# Patient Record
Sex: MALE | Race: WHITE | NOT HISPANIC OR LATINO | Employment: UNEMPLOYED | URBAN - METROPOLITAN AREA
[De-identification: names, ages, dates, MRNs, and addresses within clinical notes are randomized per-mention and may not be internally consistent; named-entity substitution may affect disease eponyms.]

---

## 2017-05-09 ENCOUNTER — GENERIC CONVERSION - ENCOUNTER (OUTPATIENT)
Dept: OTHER | Facility: OTHER | Age: 72
End: 2017-05-09

## 2017-06-13 ENCOUNTER — LAB CONVERSION - ENCOUNTER (OUTPATIENT)
Dept: OTHER | Facility: OTHER | Age: 72
End: 2017-06-13

## 2017-06-13 ENCOUNTER — GENERIC CONVERSION - ENCOUNTER (OUTPATIENT)
Dept: OTHER | Facility: OTHER | Age: 72
End: 2017-06-13

## 2017-06-13 LAB
EST. AVERAGE GLUCOSE BLD GHB EST-MCNC: 13.6 (CALC)
EST. AVERAGE GLUCOSE BLD GHB EST-MCNC: 246 (CALC)
HBA1C MFR BLD HPLC: 10.2 % OF TOTAL HGB
PROSTATE SPECIFIC ANTIGEN TOTAL (HISTORICAL): 0.8 NG/ML

## 2017-06-21 ENCOUNTER — GENERIC CONVERSION - ENCOUNTER (OUTPATIENT)
Dept: OTHER | Facility: OTHER | Age: 72
End: 2017-06-21

## 2017-08-21 ENCOUNTER — GENERIC CONVERSION - ENCOUNTER (OUTPATIENT)
Dept: OTHER | Facility: OTHER | Age: 72
End: 2017-08-21

## 2017-08-22 ENCOUNTER — ALLSCRIPTS OFFICE VISIT (OUTPATIENT)
Dept: OTHER | Facility: OTHER | Age: 72
End: 2017-08-22

## 2017-08-23 ENCOUNTER — GENERIC CONVERSION - ENCOUNTER (OUTPATIENT)
Dept: OTHER | Facility: OTHER | Age: 72
End: 2017-08-23

## 2017-10-25 ENCOUNTER — GENERIC CONVERSION - ENCOUNTER (OUTPATIENT)
Dept: OTHER | Facility: OTHER | Age: 72
End: 2017-10-25

## 2017-10-31 ENCOUNTER — GENERIC CONVERSION - ENCOUNTER (OUTPATIENT)
Dept: OTHER | Facility: OTHER | Age: 72
End: 2017-10-31

## 2018-01-10 NOTE — RESULT NOTES
Message   please enter colorectal screen on flow sheet     Verified Results  (Q) FECAL GLOBIN BY IMMUNOCHEM  (MEDICARE) [id number] 12:00AM Wendy Bellamy     Test Name Result Flag Reference   FECAL GLOBIN BY$IMMUNOCHEM  (MEDICARE)      FECAL GLOBIN BY IMMUNOCHEM   (MEDICARE)         MICRO NUMBER:      [id number]    TEST STATUS:       FINAL    SPECIMEN SOURCE:   INSURE () FOBT TEST CARD    SPECIMEN QUALITY:  ADEQUATE    RESULT:            Not Detected

## 2018-01-11 NOTE — PROGRESS NOTES
Assessment    1  Medicare annual wellness visit, subsequent (V70 0) (Z00 00)   2  Benign essential hypertension (401 1) (I10)   3  Olecranon bursitis of right elbow (726 33) (M70 21)   4  Chronic obstructive pulmonary disease (496) (J44 9)   5  Anemia (285 9) (D64 9)    Plan  Anemia    · (1) CBC/PLT/DIFF; Status:Active; Requested [id number];    · (1) FERRITIN; Status:Active; Requested OBE:53THJ4213;    · (1) TIBC; Status:Active; Requested LMV:43BNY0852;    · (Q) VITAMIN B12/FOLATE, SERUM PANEL; Status:Active; Requested BHQ:03ITS1823;   Diabetes mellitus type II, controlled    · (1) HEMOGLOBIN A1C; Status:Active; Requested CYZ:19ETL4600;   Difficulty breathing    · (1) TSH; Status:Active; Requested for:48Bzn8714;   Hyperlipidemia    · (Q) LIPID PANEL WITH DIRECT LDL; Status:Active; Requested IJY:86QCS6763; Olecranon bursitis of right elbow    · Azithromycin 500 MG Oral Tablet; 1 tab daily for 5 days    Discussion/Summary    1) blood work, cbc, hba1c and lipid in   2) anemia: stool tests - given   3) azithromycin 500mg 1 tab daily for 5 days  4) cool compresses, 15 min 3 times a day, avoid leaning, use compression sleeve  5) continue follow up with cardiology and pulmonology    Impression: Subsequent Annual Wellness Visit  Cardiovascular screening and counseling: due for a lipid panel  Diabetes screening and counseling: screening is current  Colorectal cancer screening and counseling: the patient declines screening and screening not indicated  Prostate cancer screening and counseling: screening not indicated  Osteoporosis screening and counseling: due for DXA axial skeleton  Abdominal aortic aneurysm screening and counseling: has aneurysm  Glaucoma screening and counseling: ophthalmologist referral    HIV screening and counseling: screening not indicated   Immunizations: influenza vaccine is up to date this year, influenza vaccination is recommended annually, the lifetime pneumococcal vaccine has been completed, hepatitis B vaccination series is not indicated at this time due to the patient's low risk of florentin the disease, the patient declines the Zostavax vaccine, the risks and benefits of the Td vaccine were discussed with the patient and the risks and benefits of the Tdap vaccine were discussed with the patient  Advance Directive Planning: complete and up to date  Chief Complaint  PT IS HERE FOR ANNUAL AWV  History of Present Illness  pt here with his daughter for awv  he complains of right elbow injury, scraped on countertop edge now with swelling and warmth, symptoms about 1 week  pt was on predisone october -january 20mg then 15 through February and now 10mg 1 tab daily   pt is oxygen dependent  he is not smoking cigarettes but he is using e cigarettes  He is anemic  He is seeing the pulmonologist in [place] where he lives and cardiology in [facility]  pt had pneumococcal in [facility] and had influenza immunization at his place of residence this year  The patient is being seen for the subsequent annual wellness visit  Medicare Screening and Risk Factors   Hospitalizations: no previous hospitalizations  Once per lifetime medicare screening tests: ECG has not been done and AAA screening US has not yet been done  Medicare Screening Tests Risk Questions   Abdominal aortic aneurysm risk assessment: tobacco use, over 72years of age and family history of AAA  Osteoporosis risk assessment: , over 48years of age and tobacco use  HIV risk assessment: none indicated  Drug and Alcohol Use: The patient is a former cigarette smoker, is a current smokeless tobacco user and PT CHEWS TOBACCO, AND USES E-CIGS  He has smoked for 60 YRS year(s), has 2-3 PACKS PER DAY pack year(s) of cigarette use and QUIT 1 YR AGO  He is not ready to quit using tobacco and is quitting tobacco use  The patient reports occasional alcohol use and drinking 4-5 DRINKS PER MONTH drinks per month  Alcohol concern:   The patient has no concerns about alcohol abuse  He has never used illicit drugs  Diet and Physical Activity: Current diet includes unhealthy food choices, 1-2 servings of fruit per day, 1-2 servings of vegetables per day, 1-2 servings of meat per day, 0 servings of whole grains per day, 4-5 servings of simple carbohydrates per day, 1 servings of dairy products per day, 2-3 cups of coffee per day, 0 cups of tea per day, 0 cans of regular soda per day, 1-2 cans of diet soda per day and 1-2 GLASSES OF WATER PER DAY  He exercises infrequently  Exercise: STATIONARY BIKE 20-40 WEEKLY minutes per week  Mood Disorder and Cognitive Impairment Screening: PHQ-9 Depression Scale   Over the past 2 weeks, how often have you been bothered by the following problems? 1 ) Little interest or pleasure in doing things? Several days  2 ) Feeling down, depressed or hopeless? Several days  3 ) Trouble falling asleep or sleeping too much? Not at all    4 ) Feeling tired or having little energy? Half the days or more  5 ) Poor appetite or overeating? Several days  6 ) Feeling bad about yourself, or that you are a failure, or have let yourself or your family down? Not at all    7 ) Trouble concentrating on things, such as reading a newspaper or watching television? Several days  8 ) Moving or speaking so slowly that other people could have noticed, or the opposite, moving or speaking faster than usual? Not at all  TOTAL SCORE: 6, severity of depression is mild  How difficult have these problems made it for you to do your work, take care of things at home, or get along with people? Extremely difficult  Depression screening  mild to moderate symptoms  He denies feeling down, depressed, or hopeless over the past two weeks  He reports feeling little interest or pleasure in doing things over the past two weeks     Cognitive impairment screening: denies difficulty learning/retaining new information, denies difficulty handling complex tasks, denies difficulty with reasoning, denies difficulty with spatial ability and orientation, denies difficulty with language and denies difficulty with behavior  Functional Ability/Level of Safety: Hearing is normal bilaterally, normal in the right ear, normal in the left ear and a hearing aid is not used  He denies hearing difficulties  The patient is currently able to do activities of daily living with limitations, able to do instrumental activities of daily living with limitations, able to participate in social activities with limitations and able to drive with limitations  Activities of daily living details: transportation help needed, needs help shopping, meal preparation help needed, needs help doing housework, needs help doing laundry, needs help managing medications and PT HAS DIFFICULITY DRIVING AT NIGHT, but does not need help using the phone and does not need help managing money  Home safety risk factors:  no handrails on the stairs, but no unfamiliar surroundings, no loose rugs, no poor household lighting, no uneven floors, no household clutter and grab bars in the bathroom  Advance Directives: Advance directives: living will, durable power of  for health care directives, advance directives and medical decisions will be made by Clemmie Pallas, daughter, and wife  end of life decisions were reviewed with the patient and I agree with the patient's decisions  Co-Managers and Medical Equipment/Suppliers: See Patient Care Team      Patient Care Team    Care Team Member Role Specialty Office Number   Balwinder Garcias MD Specialist Cardiology [phone]   Osman SANTOS   Specialist Pulmonary Medicine [phone]   Adryan Granger MD  Urology [phone] [address] Specialist Thoracic Surgery [phone]   Mary Corona [address] [facility] [phone]   Devi Rasmussen 79, [address] [facility] [place] [facility] Systems    Constitutional: negative  Head and Face: negative  Eyes: negative  ENT: negative  Cardiovascular: negative  Respiratory: shortness of breath, but as noted in HPI  Gastrointestinal: negative  Genitourinary: negative  Musculoskeletal: right elbow inury, but as noted in HPI  Integumentary and Breasts: negative  Neurological: negative  Psychiatric: negative  Endocrine: negative  Hematologic and Lymphatic: negative  Active Problems    1  Abdominal aortic aneurysm without rupture (441 4) (I71 4)   2  Allergic rhinitis (477 9) (J30 9)   3  Anemia (285 9) (D64 9)   4  Aneurysm of ascending aorta (441 2) (I71 2)   5  Anxiety disorder (300 00) (F41 9)   6  Benign essential hypertension (401 1) (I10)   7  Benign prostatic hypertrophy (600 00) (N40 0)   8  Bronchitis, chronic obstructive, with exacerbation (491 21) (J44 1)   9  CAD (coronary artery disease) (414 00) (I25 10)   10  Chronic obstructive pulmonary disease (496) (J44 9)   11  Depression (311) (F32 9)   12  Diabetes mellitus type II, controlled (250 00) (E11 9)   13  Difficulty breathing (786 09) (R06 89)   14  Ecchymoses, spontaneous (782 7) (R23 3)   15  Encounter for screening colonoscopy (V76 51) (Z12 11)   16  Erectile dysfunction of non-organic origin (302 72) (F52 21)   17  Hypercholesteremia (272 0) (E78 0)   18  Hyperlipidemia (272 4) (E78 5)   19  Hyperthyroidism (242 90) (E05 90)   20  Hypoxemia (799 02) (R09 02)   21  Impaired fasting glucose (790 21) (R73 01)   22  Insomnia (780 52) (G47 00)   23  Medicare annual wellness visit, subsequent (V70 0) (Z00 00)   24  Need for prophylactic vaccination and inoculation against influenza (V04 81) (Z23)   25  Osteoporosis screening (V82 81) (Z13 820)   26  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)   27  Screening for colon cancer (V76 51) (Z12 11)   28  Sebaceous cyst (706 2) (L72 3)   29  Vascular Dementia (290 40)    Past Medical History    1  History of Acute Myocardial Infarction (V12 59)   2  History of Anaphylaxis (V13 81)   3  History of Angioedema (995 1)   4  History of Exposed To Contagious Viral Disease (Influenza) (V01 79)   5  History of shortness of breath (V13 89) (Z87 898)   6  History of transient cerebral ischemia (V12 54) (Z86 73)   7  Need for prophylactic vaccination and inoculation against influenza (V04 81) (Z23)   8  History of Status post angioplasty (V45 89) (Z98 62)    The active problems and past medical history were reviewed and updated today  Surgical History    1  History of Abdominal Aortic Aneurysm Repair For Dilation Or Occlusion   2  History of Palatopharyngoplasty    The surgical history was reviewed and updated today  Social History    · Current Every Day Smoker (305 1)   · Death In The Family Spouse  The social history was reviewed and updated today  The social history was reviewed and is unchanged  Current Meds   1  Advair Diskus 500-50 MCG/DOSE Inhalation Aerosol Powder Breath Activated; USE 1   INHALATION ORALLY    TWICE DAILY; Therapy: 60UOP3919 to (Evaluate:13Woc7405)  Requested for: 97JND3828; Last   Rx:32Krx3699 Ordered   2  Azithromycin 250 MG Oral Tablet; One tablet daily; Last Rx:40Iye1118 Ordered   3  Azithromycin 250 MG Oral Tablet; Take 1 tablet daily; Therapy: 31XMR3125 to (Evaluate:07Jan2017)  Requested for: 12Apr2016; Last   Rx:12Apr2016 Ordered   4  Budesonide 0 25 MG/2ML Inhalation Suspension; INHALE THE CONTENTS OF 1   UNIT   DOSE VIA NEBULIZER    TWO  TIMES A DAY; Therapy: 89EZQ5872 to (Evaluate:95Ask3778)  Requested for: 54Mnp3572; Last   Rx:16Elq9699 Ordered   5  BusPIRone HCl - 15 MG Oral Tablet; TAKE 1 TABLET 3 times a day; Therapy: 08EYQ0343 to (Evaluate:58Yba7600)  Requested for: 12Apr2016; Last   Rx:12Apr2016 Ordered   6  Cialis 5 MG Oral Tablet; Therapy: 02Apr2013 to Recorded   7   Combivent Respimat  MCG/ACT Inhalation Aerosol Solution; USE 1 INHALATION ORALLY 4  TIMES DAILY (MAXIMUM OF 6  INHALATIONS IN   24 HOURS); Therapy: 40HYD1287 to (Evaluate:66Kjv8852)  Requested for: 48RDP9652; Last   Rx:04Mar2016 Ordered   8  Crestor 10 MG Oral Tablet; TAKE 1 TABLET DAILY AT     BEDTIME; Therapy: 59BUR0029 to (Evaluate:97Oiz4084)  Requested for: 73MLD6532; Last   Rx:34Xrq4567 Ordered   9  Diltiazem HCl ER Coated Beads 240 MG Oral Capsule Extended Release 24 Hour; take   1 capsule bid; Therapy: (Recorded:19Mar2014) to Recorded   10  EpiPen 2-Rajan 0 3 MG/0 3ML JEFF; INJECT SUBCUTANEOUSLY AS DIRECTED; Therapy: 14QMS0835 to (Last [place])  Requested for: 83JDV6146 Ordered   11  Fish Oil CAPS; 1200 mg, one capsule daily; Therapy: (Recorded:79Riu6363) to Recorded   12  Fluticasone Propionate 50 MCG/ACT Nasal Suspension; USE 2 SPRAYS IN EACH          NOSTRIL DAILY; Therapy: 36CBF4814 to (Evaluate:29Iwo0185)  Requested for: 72EED3613; Last    Rx:81Bgt6126 Ordered   13  Folic Acid 050 MCG Oral Tablet; TAKE 1 TABLET DAILY AS DIRECTED; Therapy: (Recorded:83Cvo5337) to Recorded   14  Furosemide 20 MG Oral Tablet; take 1 tablet by mouth every day; Therapy: 79FAU6671 to (Evaluate:06Jun2016)  Requested for: 75KAI0869; Last    Rx:08Mar2016 Ordered   15  GuaiFENesin 400 MG Oral Tablet; TAKE 1 TABLET EVERY 8 HOURS AS NEEDED; Therapy: 93IWD9887 to Recorded   16  Iron 142 (45 Fe) MG Oral Tablet Extended Release; Therapy: 45Lnu3030 to Recorded   17  Januvia 100 MG Oral Tablet; TAKE 1 TABLET DAILY AS     DIRECTED; Therapy: 71KHM9470 to (Evaluate:60Fqv3628)  Requested for: 92Dju4119; Last    Rx:12Apr2016 Ordered   18  Klor-Con 10 10 MEQ Oral Tablet Extended Release; Take one tablet twice a day; Therapy: 87NYF2137 to (Evaluate:62Sip2862)  Requested for: 72OFV7519; Last    Rx:21Mej6448 Ordered   19  Levalbuterol HCl - 1 25 MG/3ML Inhalation Nebulization Solution; INHALE THE    CONTENTS OF 1   VIAL VIA NEBULIZER EVERY   4-6    HOURS AS NEEDED;     Therapy: 51IJP6431 to (Evaluate:20Mar2016)  Requested for: 58RTZ9581; Last    Rx:07Klh9491 Ordered   20  Levofloxacin 500 MG Oral Tablet; One tablet a day for 10 days; Therapy: 79ARK3863 to (Evaluate:79Zcd7856)  Requested for: 79LHU0215; Last    Rx:51Cdt5437 Ordered   21  LORazepam 0 5 MG Oral Tablet; TAKE 1 TAB 4 TIMES A DAY AS NEEDED; Therapy: 62WZY3814 to (Evaluate:67Amr6677)  Requested for: 33Glv7556; Last    Rx:12Apr2016 Ordered   22  MetFORMIN HCl ER (OSM) 1000 MG Oral Tablet Extended Release 24 Hour; take 1 tablet    twice a day; Therapy: 61Mjh0714 to (Evaluate:09Xau7030)  Requested for: 34Kub2828; Last    Rx:70Zna9535 Ordered   23  Mirtazapine 15 MG Oral Tablet; TAKE 1 TABLET AT BEDTIME; Therapy: 23XMJ6554 to (Evaluate:44Sfc8758)  Requested for: 12Apr2016; Last    Rx:12Apr2016 Ordered   24  Plavix 75 MG Oral Tablet; TAKE 1 TABLET DAILY; Therapy: 19Clp0792 to ()  Requested for: 80Iep0656 Recorded   25  Potassium Chloride Tonja ER 10 MEQ Oral Tablet Extended Release; Take one tablet    twice a day; Therapy: 40KGK6810 to (Evaluate:19Nov2016)  Requested for: 89IYA7555; Last    Rx:25Nov2015 Ordered   26  PredniSONE 10 MG Oral Tablet; TAKE 1 TABLET DAILY AS DIRECTED; Therapy: 79RAT3478 to (Evaluate:64Dyy6841)  Requested for: 20XRA7036; Last    Rx:02Nov2015 Ordered   27  PredniSONE 5 MG Oral Tablet; take as directed; Therapy: 83CMU4145 to (Carol Cabrera)  Requested for: 46AUV8444; Last    Rx:02Nov2015 Ordered   28  Prevnar 13 Intramuscular Suspension; 0 5 ml IM at pharmacy; Therapy: 38Dns3370 to (Last Rx:29Apr2015) Ordered   29  ProAir  (90 Base) MCG/ACT Inhalation Aerosol Solution; INHALE 2 PUFFS    EVERY 4 HOURS AS NEEDED; Therapy: 49FAB4412 to (Evaluate:16Jun2016)  Requested for: 53MQC8738; Last    Rx:22Jun2015 Ordered   30  TraZODone HCl - 50 MG Oral Tablet; take 2 tablets at bedtime;     Therapy: 31SEJ5859 to (Evaluate:04Huf8643)  Requested for: 12Apr2016; Last    Rx:12Apr2016 Ordered   31  Valsartan-Hydrochlorothiazide 160-12 5 MG Oral Tablet; TAKE 1 TABLET DAILY; Therapy: (Recorded:25Cne5581) to Recorded   32  ZyrTEC Allergy TABS; TAKE 1 TABLET DAILY; Therapy: (Recorded:10Dms9285) to Recorded    The medication list was reviewed and updated today  Allergies    1  Rapaflo CAPS   2  Flomax CP24   3  Vasotec TABS   4  Wellbutrin TABS    Immunizations  Influenza --- Pegge Dopp: 49WBG8389Pxdau Evaristo: 98JAP2889   Pneumococcal --- Pegge Dopp: 64YDU8396;  Series2: 18AGH1180     Vitals  Signs [Data Includes: Current Encounter]   Recorded: 59WLW6500 01:59PM   Temperature: 98 4 F  Heart Rate: 77  Systolic: 437  Diastolic: 74  Height: 5 ft 7 in  Weight: 161 lb 12 8 oz  BMI Calculated: 25 34  BSA Calculated: 1 85  O2 Saturation: 78    Signatures   Electronically signed by : Lamberto Tucker DO; May  3 2016 10:26PM EST                       (Author)

## 2018-01-13 NOTE — RESULT NOTES
Verified Results  (Q) FECAL GLOBIN BY IMMUNOCHEM  (MEDICARE) [id number] 12:00AM Sukhjinder Canas     Test Name Result Flag Reference   FECAL GLOBIN BY$IMMUNOCHEM  (MEDICARE)      FECAL GLOBIN BY IMMUNOCHEM  (MEDICARE)         MICRO NUMBER:      [id number]    TEST STATUS:       FINAL    SPECIMEN SOURCE:   NOT GIVEN    SPECIMEN QUALITY:  ADEQUATE    RESULT:            Test cancelled per client request      (Q) MICROALBUMIN, RANDOM URINE (W/CREATININE) 44ZIH8259 12:00AM Sukhjinder Canas     Test Name Result Flag Reference   CREATININE, RANDOM URINE 191 mg/dL     MICROALBUMIN 2 2 mg/dL     Reference Range  Not established   MICROALBUMIN/CREATININE$RATIO, RANDOM URINE 12 mcg/mg creat  <30   The ADA defines abnormalities in albumin  excretion as follows:     Category         Result (mcg/mg creatinine)     Normal                    <30  Microalbuminuria            Clinical albuminuria   > OR = 300     The ADA recommends that at least two of three  specimens collected within a 3-6 month period be  abnormal before considering a patient to be  within a diagnostic category

## 2018-01-14 VITALS
TEMPERATURE: 98.3 F | WEIGHT: 157.5 LBS | BODY MASS INDEX: 24.72 KG/M2 | DIASTOLIC BLOOD PRESSURE: 60 MMHG | HEART RATE: 78 BPM | SYSTOLIC BLOOD PRESSURE: 108 MMHG | OXYGEN SATURATION: 74 % | HEIGHT: 67 IN

## 2018-01-14 NOTE — RESULT NOTES
Verified Results  (Q) LIPID PANEL WITH DIRECT LDL 20EVF4804 10:57AM Christina Mejia     Test Name Result Flag Reference   CHOLESTEROL, TOTAL 170 mg/dL  125-200   HDL CHOLESTEROL 90 mg/dL  > OR = 40   TRIGLICERIDES 97 mg/dL  <886   DIRECT LDL 63 mg/dL  <130   Desirable range <100 mg/dL for patients with CHD or  diabetes and <70 mg/dL for diabetic patients with  known heart disease  CHOL/HDLC RATIO 1 9 (calc)  < OR = 5 0   NON HDL CHOLESTEROL 80 mg/dL (calc)     Target for non-HDL cholesterol is 30 mg/dL higher than   LDL cholesterol target

## 2018-01-15 NOTE — RESULT NOTES
Verified Results  (Q) HEMOGLOBIN A1c 59SRU6582 12:40PM Carole New   REPORT COMMENT:  FASTING:NO     Test Name Result Flag Reference   HEMOGLOBIN A1c 9 3 % of total Hgb H <5 7   According to ADA guidelines, hemoglobin A1c <7 0%  represents optimal control in non-pregnant diabetic  patients  Different metrics may apply to specific  patient populations  Standards of Medical Care in    Diabetes Care  2013;36:s11-s66     For the purpose of screening for the presence of  diabetes  <5 7%       Consistent with the absence of diabetes  5 7-6 4%    Consistent with increased risk for diabetes              (prediabetes)  >or=6 5%    Consistent with diabetes     This assay result is consistent with diabetes  mellitus  Currently, no consensus exists for use of hemoglobin  A1c for diagnosis of diabetes for children

## 2018-01-16 NOTE — RESULT NOTES
Verified Results  (Q) HEMOGLOBIN A1c WITH eAG 12Jun2017 11:44AM Roshan Simms   REPORT COMMENT:  FASTING:YES     Test Name Result Flag Reference   HEMOGLOBIN A1c 10 2 % of total Hgb H <5 7   For someone without known diabetes, a hemoglobin A1c  value of 6 5% or greater indicates that they may have   diabetes and this should be confirmed with a follow-up   test      For someone with known diabetes, a value <7% indicates   that their diabetes is well controlled and a value   greater than or equal to 7% indicates suboptimal   control  A1c targets should be individualized based on   duration of diabetes, age, comorbid conditions, and   other considerations  Currently, no consensus exists regarding use of  hemoglobin A1c for diagnosis of diabetes for children  eAG (mg/dL) 246 (calc)     eAG (mmol/L) 13 6 (calc)       (1) PSA (SCREEN) (Dx V76 44 Screen for Prostate Cancer) 37BZL7820 11:44AM Roshan Simms     Test Name Result Flag Reference   PSA, TOTAL 0 8 ng/mL  < OR = 4 0   The total PSA value from this assay system is   standardized against the WHO standard  The test   result will be approximately 20% lower when compared   to the equimolar-standardized total PSA (Elizabeth   Adel)  Comparison of serial PSA results should be   interpreted with this fact in mind  This test was performed using the Siemens   chemiluminescent method  Values obtained from   different assay methods cannot be used  interchangeably  PSA levels, regardless of  value, should not be interpreted as absolute  evidence of the presence or absence of disease

## 2018-02-09 DIAGNOSIS — E11.59 TYPE 2 DIABETES MELLITUS WITH OTHER CIRCULATORY COMPLICATION, WITHOUT LONG-TERM CURRENT USE OF INSULIN (HCC): Primary | ICD-10-CM

## 2018-02-09 DIAGNOSIS — F51.04 CHRONIC INSOMNIA: ICD-10-CM

## 2018-02-09 DIAGNOSIS — J44.9 CHRONIC OBSTRUCTIVE PULMONARY DISEASE, UNSPECIFIED COPD TYPE (HCC): ICD-10-CM

## 2018-02-09 DIAGNOSIS — E78.49 OTHER HYPERLIPIDEMIA: ICD-10-CM

## 2018-02-10 RX ORDER — TRAZODONE HYDROCHLORIDE 50 MG/1
TABLET ORAL
Qty: 180 TABLET | Refills: 0 | Status: SHIPPED | OUTPATIENT
Start: 2018-02-10 | End: 2018-04-30 | Stop reason: SDUPTHER

## 2018-02-10 RX ORDER — ROSUVASTATIN CALCIUM 10 MG/1
TABLET, COATED ORAL
Qty: 90 TABLET | Refills: 0 | Status: SHIPPED | OUTPATIENT
Start: 2018-02-10 | End: 2018-04-30 | Stop reason: SDUPTHER

## 2018-02-10 RX ORDER — IPRATROPIUM/ALBUTEROL SULFATE 20-100 MCG
MIST INHALER (GRAM) INHALATION
Qty: 9 INHALER | Refills: 2 | Status: SHIPPED | OUTPATIENT
Start: 2018-02-10 | End: 2019-06-07 | Stop reason: SDUPTHER

## 2018-02-10 RX ORDER — SITAGLIPTIN 100 MG/1
TABLET, FILM COATED ORAL
Qty: 90 TABLET | Refills: 0 | Status: SHIPPED | OUTPATIENT
Start: 2018-02-10 | End: 2018-04-30 | Stop reason: SDUPTHER

## 2018-03-29 ENCOUNTER — TELEPHONE (OUTPATIENT)
Dept: FAMILY MEDICINE CLINIC | Facility: CLINIC | Age: 73
End: 2018-03-29

## 2018-03-30 DIAGNOSIS — F51.01 PRIMARY INSOMNIA: ICD-10-CM

## 2018-03-30 DIAGNOSIS — R35.0 BENIGN PROSTATIC HYPERPLASIA WITH URINARY FREQUENCY: Primary | ICD-10-CM

## 2018-03-30 DIAGNOSIS — F52.21 ERECTILE DYSFUNCTION OF NON-ORGANIC ORIGIN: ICD-10-CM

## 2018-03-30 DIAGNOSIS — N40.1 BENIGN PROSTATIC HYPERPLASIA WITH URINARY FREQUENCY: Primary | ICD-10-CM

## 2018-03-30 DIAGNOSIS — R53.82 CHRONIC FATIGUE: ICD-10-CM

## 2018-03-30 DIAGNOSIS — F33.1 MODERATE EPISODE OF RECURRENT MAJOR DEPRESSIVE DISORDER (HCC): ICD-10-CM

## 2018-04-30 DIAGNOSIS — E78.49 OTHER HYPERLIPIDEMIA: ICD-10-CM

## 2018-04-30 DIAGNOSIS — E11.59 TYPE 2 DIABETES MELLITUS WITH OTHER CIRCULATORY COMPLICATION, WITHOUT LONG-TERM CURRENT USE OF INSULIN (HCC): ICD-10-CM

## 2018-04-30 DIAGNOSIS — F51.04 CHRONIC INSOMNIA: ICD-10-CM

## 2018-05-01 RX ORDER — SITAGLIPTIN 100 MG/1
TABLET, FILM COATED ORAL
Qty: 90 TABLET | Refills: 0 | Status: SHIPPED | OUTPATIENT
Start: 2018-05-01 | End: 2018-07-17 | Stop reason: SDUPTHER

## 2018-05-01 RX ORDER — TRAZODONE HYDROCHLORIDE 50 MG/1
TABLET ORAL
Qty: 180 TABLET | Refills: 0 | Status: SHIPPED | OUTPATIENT
Start: 2018-05-01 | End: 2018-07-17 | Stop reason: SDUPTHER

## 2018-05-01 RX ORDER — ROSUVASTATIN CALCIUM 10 MG/1
TABLET, COATED ORAL
Qty: 90 TABLET | Refills: 0 | Status: SHIPPED | OUTPATIENT
Start: 2018-05-01 | End: 2018-07-17 | Stop reason: SDUPTHER

## 2018-05-01 RX ORDER — MIRTAZAPINE 15 MG/1
TABLET, FILM COATED ORAL
Qty: 90 TABLET | Refills: 0 | Status: SHIPPED | OUTPATIENT
Start: 2018-05-01 | End: 2018-07-17 | Stop reason: SDUPTHER

## 2018-05-04 ENCOUNTER — TELEPHONE (OUTPATIENT)
Dept: FAMILY MEDICINE CLINIC | Facility: CLINIC | Age: 73
End: 2018-05-04

## 2018-05-04 LAB
DIFF CAP.CO: 5.4 ML/MMHG SEC
FEV1/FVC: 41 %
FEV1: 0.39 LITERS
FVC VOL RESPIRATORY: 0.96 LITERS
TLC: 4.33 LITERS

## 2018-05-04 ASSESSMENT — PULMONARY FUNCTION TESTS
FVC: 0.96
FEV1/FVC: 41
FEV1: 0.39

## 2018-05-16 ENCOUNTER — TELEPHONE (OUTPATIENT)
Dept: FAMILY MEDICINE CLINIC | Facility: CLINIC | Age: 73
End: 2018-05-16

## 2018-05-16 NOTE — TELEPHONE ENCOUNTER
Pt's daughter Is requesting a med refill for lorazepam 0 5 mg for pt,  FYI-pt' daughter will stop by tomorrow to  a hard copy of a blood test order for testosterone

## 2018-05-17 DIAGNOSIS — F33.1 MODERATE EPISODE OF RECURRENT MAJOR DEPRESSIVE DISORDER (HCC): ICD-10-CM

## 2018-05-17 DIAGNOSIS — R53.82 CHRONIC FATIGUE: ICD-10-CM

## 2018-05-17 DIAGNOSIS — F52.21 ERECTILE DYSFUNCTION OF NON-ORGANIC ORIGIN: ICD-10-CM

## 2018-05-17 DIAGNOSIS — F41.9 ANXIETY: Primary | ICD-10-CM

## 2018-05-17 RX ORDER — LORAZEPAM 0.5 MG/1
1 TABLET ORAL 4 TIMES DAILY PRN
COMMUNITY
Start: 2012-03-30 | End: 2018-05-17 | Stop reason: SDUPTHER

## 2018-05-17 RX ORDER — LORAZEPAM 0.5 MG/1
0.5 TABLET ORAL 4 TIMES DAILY PRN
Qty: 120 TABLET | Refills: 0 | Status: SHIPPED | OUTPATIENT
Start: 2018-05-17 | End: 2018-08-24 | Stop reason: SDUPTHER

## 2018-05-17 NOTE — TELEPHONE ENCOUNTER
Will send lorazepam to pharmacy  Which lab is he going for the testosterone? So I can put in orders?    Please call daughter

## 2018-05-17 NOTE — TELEPHONE ENCOUNTER
Informed pt daughter   she stated she will be going to [facility] or [facility]  Pt daughter did  a hard copy of the order

## 2018-05-18 ENCOUNTER — OFFICE VISIT (OUTPATIENT)
Dept: FAMILY MEDICINE CLINIC | Facility: CLINIC | Age: 73
End: 2018-05-18
Payer: MEDICARE

## 2018-05-18 VITALS
BODY MASS INDEX: 24.43 KG/M2 | SYSTOLIC BLOOD PRESSURE: 112 MMHG | TEMPERATURE: 97.6 F | HEART RATE: 74 BPM | DIASTOLIC BLOOD PRESSURE: 50 MMHG | OXYGEN SATURATION: 84 % | WEIGHT: 156 LBS

## 2018-05-18 DIAGNOSIS — IMO0002 UNCONTROLLED TYPE 2 DIABETES MELLITUS WITH OTHER SPECIFIED COMPLICATION, WITHOUT LONG-TERM CURRENT USE OF INSULIN: Primary | ICD-10-CM

## 2018-05-18 DIAGNOSIS — J43.9 PULMONARY EMPHYSEMA, UNSPECIFIED EMPHYSEMA TYPE (HCC): ICD-10-CM

## 2018-05-18 DIAGNOSIS — I10 BENIGN ESSENTIAL HYPERTENSION: ICD-10-CM

## 2018-05-18 DIAGNOSIS — I71.2 THORACIC AORTIC ANEURYSM WITHOUT RUPTURE (HCC): ICD-10-CM

## 2018-05-18 DIAGNOSIS — E78.2 MIXED HYPERLIPIDEMIA: ICD-10-CM

## 2018-05-18 DIAGNOSIS — I25.10 CORONARY ARTERY DISEASE INVOLVING NATIVE HEART WITHOUT ANGINA PECTORIS, UNSPECIFIED VESSEL OR LESION TYPE: ICD-10-CM

## 2018-05-18 DIAGNOSIS — J96.11 CHRONIC HYPOXEMIC RESPIRATORY FAILURE (HCC): ICD-10-CM

## 2018-05-18 DIAGNOSIS — I73.9 PERIPHERAL VASCULAR DISEASE (HCC): ICD-10-CM

## 2018-05-18 PROBLEM — R32 URINARY INCONTINENCE: Status: ACTIVE | Noted: 2017-08-22

## 2018-05-18 LAB — SL AMB POCT HEMOGLOBIN AIC: 10

## 2018-05-18 PROCEDURE — 83036 HEMOGLOBIN GLYCOSYLATED A1C: CPT | Performed by: FAMILY MEDICINE

## 2018-05-18 PROCEDURE — 99214 OFFICE O/P EST MOD 30 MIN: CPT | Performed by: FAMILY MEDICINE

## 2018-05-18 RX ORDER — ONDANSETRON 4 MG/1
1 TABLET, ORALLY DISINTEGRATING ORAL EVERY 8 HOURS
COMMUNITY
Start: 2017-12-09 | End: 2018-11-23 | Stop reason: SDUPTHER

## 2018-05-18 RX ORDER — LORAZEPAM 0.5 MG/1
0.5 TABLET ORAL
COMMUNITY
Start: 2013-03-04 | End: 2018-07-01 | Stop reason: SDUPTHER

## 2018-05-18 RX ORDER — POTASSIUM CHLORIDE 750 MG/1
1 TABLET, FILM COATED, EXTENDED RELEASE ORAL 2 TIMES DAILY
COMMUNITY
Start: 2014-12-16

## 2018-05-18 RX ORDER — CYCLOBENZAPRINE HCL 10 MG
TABLET ORAL
COMMUNITY
Start: 2017-11-01

## 2018-05-18 RX ORDER — AZITHROMYCIN 250 MG/1
250 TABLET, FILM COATED ORAL
COMMUNITY
End: 2018-08-14 | Stop reason: SDUPTHER

## 2018-05-18 RX ORDER — TADALAFIL 5 MG/1
TABLET ORAL
COMMUNITY
Start: 2013-04-02

## 2018-05-18 RX ORDER — ALBUTEROL SULFATE 90 UG/1
AEROSOL, METERED RESPIRATORY (INHALATION)
COMMUNITY
Start: 2014-03-19 | End: 2018-10-02 | Stop reason: SDUPTHER

## 2018-05-18 RX ORDER — FINASTERIDE 5 MG/1
TABLET, FILM COATED ORAL
COMMUNITY

## 2018-05-18 RX ORDER — ROSUVASTATIN CALCIUM 10 MG/1
10 TABLET, COATED ORAL
COMMUNITY
Start: 2012-06-19 | End: 2018-07-01 | Stop reason: SDUPTHER

## 2018-05-18 RX ORDER — PREDNISONE 10 MG/1
10 TABLET ORAL DAILY
Refills: 1 | COMMUNITY
Start: 2018-04-26 | End: 2018-06-20 | Stop reason: SDUPTHER

## 2018-05-18 RX ORDER — LEVALBUTEROL 1.25 MG/.5ML
SOLUTION, CONCENTRATE RESPIRATORY (INHALATION)
COMMUNITY
Start: 2013-10-15 | End: 2018-05-18

## 2018-05-18 RX ORDER — LEVALBUTEROL INHALATION SOLUTION 1.25 MG/3ML
1 SOLUTION RESPIRATORY (INHALATION)
COMMUNITY
Start: 2012-10-11

## 2018-05-18 RX ORDER — BUDESONIDE 0.25 MG/2ML
INHALANT ORAL
COMMUNITY
Start: 2018-04-30 | End: 2018-08-24 | Stop reason: SDUPTHER

## 2018-05-18 RX ORDER — VALSARTAN 80 MG/1
40 TABLET ORAL
COMMUNITY
Start: 2018-05-18 | End: 2018-11-23 | Stop reason: ALTCHOICE

## 2018-05-18 RX ORDER — MIRTAZAPINE 15 MG/1
15 TABLET, FILM COATED ORAL
COMMUNITY
Start: 2014-03-21 | End: 2018-05-18

## 2018-05-18 RX ORDER — EPINEPHRINE 0.3 MG/.3ML
INJECTION SUBCUTANEOUS
COMMUNITY
Start: 2012-10-24

## 2018-05-18 RX ORDER — PREDNISONE 10 MG/1
15 TABLET ORAL
COMMUNITY
Start: 2013-10-22 | End: 2018-07-01 | Stop reason: SDUPTHER

## 2018-05-18 RX ORDER — OSELTAMIVIR PHOSPHATE 75 MG/1
1 CAPSULE ORAL 2 TIMES DAILY
COMMUNITY
Start: 2017-08-23

## 2018-05-18 RX ORDER — TRAZODONE HYDROCHLORIDE 50 MG/1
50 TABLET ORAL
COMMUNITY
Start: 2012-06-19 | End: 2018-07-01 | Stop reason: SDUPTHER

## 2018-05-18 RX ORDER — LANOLIN ALCOHOL/MO/W.PET/CERES
400 CREAM (GRAM) TOPICAL
COMMUNITY
Start: 2012-09-21

## 2018-05-18 RX ORDER — ALBUTEROL SULFATE 2.5 MG/3ML
SOLUTION RESPIRATORY (INHALATION)
COMMUNITY
Start: 2018-04-30

## 2018-05-18 RX ORDER — POTASSIUM CHLORIDE 750 MG/1
CAPSULE, EXTENDED RELEASE ORAL
COMMUNITY
Start: 2018-03-13 | End: 2018-05-18

## 2018-05-18 RX ORDER — FUROSEMIDE 40 MG/1
TABLET ORAL
COMMUNITY
Start: 2018-02-28

## 2018-05-18 RX ORDER — CLOPIDOGREL BISULFATE 75 MG/1
75 TABLET ORAL
COMMUNITY
Start: 2018-02-09

## 2018-05-18 RX ORDER — FLUTICASONE PROPIONATE 50 MCG
2 SPRAY, SUSPENSION (ML) NASAL
COMMUNITY
Start: 2015-09-23

## 2018-05-18 RX ORDER — DILTIAZEM HYDROCHLORIDE 240 MG/1
CAPSULE, COATED, EXTENDED RELEASE ORAL
COMMUNITY
Start: 2018-01-02

## 2018-05-18 RX ORDER — BUSPIRONE HYDROCHLORIDE 15 MG/1
15 TABLET ORAL
COMMUNITY
Start: 2013-10-22 | End: 2018-08-03 | Stop reason: SDUPTHER

## 2018-05-18 RX ORDER — GUAIFENESIN 400 MG/1
1 TABLET ORAL EVERY 8 HOURS PRN
COMMUNITY
Start: 2012-07-26

## 2018-05-18 NOTE — PROGRESS NOTES
Assessment/Plan:    Diabetes type 2, uncontrolled ([facility] )  Persistently uncontrolled diabetes in the setting of end-stage respiratory failure on chronic prednisone  The patient is absolutely uninterested in unwilling to take insulin which would likely be the only thing that would get his diabetes controlled at this point  He is willing to try a new oral medication so I am going to add [place]  Both he and his daughter are completely aware of the uncontrolled diabetes and the effects that it can have on his vascular disease  He has extensive vascular disease throughout his body and is following regularly with specialists at [facility]  At this point given the fact that we know he is not going to get his diabetes controlled he does not necessarily need to have his A1c checked every 3 months  We certainly could do in 3-6 months to see the effect of the new medication  Benign essential hypertension  Well controlled, continue regular follow-up with the cardiologist and continue current medications  Hyperlipidemia  Well controlled, continue medications and routine follow-up with cardiologist     Aneurysm of thoracic aorta ([facility] )  Stable, Continue routine follow-up with the vascular surgeon and CT surgeon  Peripheral vascular disease ([facility] )  Stable, continue routine follow-up with the vascular surgeon and CT surgeon  Chronic hypoxemic respiratory failure (HCC)  The patient is on increasing doses of prednisone with the hopes of going back to 10 mg of prednisone will be on this medication for the rest of his life  He is oxygen dependent and chronically short of breath  He is end-stage emphysema per the cardiology notes  He will continue follow-up with the pulmonologist as scheduled  He will continue his current medications  Subjective:      Patient ID: Kory Garner is a 67 y o  male      The patient is here today for a diabetes follow-up  He has not been seen here in about a year  He is followed routinely at [facility] with the cardiologist, vascular surgeon, and CT surgeon  He is not followed by an endocrinologist  His last A1c was 1 year ago, it was 10 2  His A1c in the office today is 10 0  He did have an almost complete lab panel done at the end of April at [facility]  Pertinent labs were as follows:  Hb 10 6  Creatinine - 1 05  K - 5 2  AST and ALT - 17  LDL - 51  HDL - 94  TG - 139  TSH 1 45  A microalbumin was not done  He had a PSA in June of 2017 which was 0 8  He has end-stage emphysema        The following portions of the patient's history were reviewed and updated as appropriate: allergies, current medications, past family history, past medical history, past social history, past surgical history and problem list     Review of Systems      Objective:  Vitals:    05/18/18 1601   BP: 112/50   Pulse: 74   Temp: 97 6 °F (36 4 °C)   SpO2: (!) 84%   Weight: 70 8 kg (156 lb)      Physical Exam   Constitutional: He is oriented to person, place, and time  He appears well-developed and well-nourished  He appears distressed (Out of breath with pursed lip breathing even with oxygen on and at rest)  HENT:   Head: Normocephalic and atraumatic  Eyes: Conjunctivae are normal    Neck: Normal range of motion  Neck supple  Cardiovascular: Normal rate and regular rhythm  No murmur heard  Pulmonary/Chest: He is in respiratory distress (See notation above)  He has decreased breath sounds (Diffusely)  He has no wheezes  He has no rhonchi  He has no rales  Neurological: He is alert and oriented to person, place, and time  Skin: Skin is warm and dry  Diffuse ecchymosis on both forearms   Psychiatric: He has a normal mood and affect

## 2018-05-18 NOTE — ASSESSMENT & PLAN NOTE
Persistently uncontrolled diabetes in the setting of end-stage respiratory failure on chronic prednisone  The patient is absolutely uninterested in unwilling to take insulin which would likely be the only thing that would get his diabetes controlled at this point  He is willing to try a new oral medication so I am going to add [place]  Both he and his daughter are completely aware of the uncontrolled diabetes and the effects that it can have on his vascular disease  He has extensive vascular disease throughout his body and is following regularly with specialists at [facility]  At this point given the fact that we know he is not going to get his diabetes controlled he does not necessarily need to have his A1c checked every 3 months  We certainly could do in 3-6 months to see the effect of the new medication

## 2018-05-18 NOTE — ASSESSMENT & PLAN NOTE
The patient is on increasing doses of prednisone with the hopes of going back to 10 mg of prednisone will be on this medication for the rest of his life  He is oxygen dependent and chronically short of breath  He is end-stage emphysema per the cardiology notes  He will continue follow-up with the pulmonologist as scheduled  He will continue his current medications

## 2018-05-21 ENCOUNTER — TELEPHONE (OUTPATIENT)
Dept: FAMILY MEDICINE CLINIC | Facility: CLINIC | Age: 73
End: 2018-05-21

## 2018-05-21 NOTE — TELEPHONE ENCOUNTER
Please let the patient or his daughter no that I did a preauthorization on the medication [place] and it was approved so he should be able to get this from the pharmacy now

## 2018-06-20 DIAGNOSIS — J44.9 CHRONIC OBSTRUCTIVE PULMONARY DISEASE, UNSPECIFIED COPD TYPE (HCC): Primary | ICD-10-CM

## 2018-06-21 RX ORDER — PREDNISONE 10 MG/1
5 TABLET ORAL DAILY
Qty: 30 TABLET | Refills: 2 | Status: SHIPPED | OUTPATIENT
Start: 2018-06-21 | End: 2018-08-24 | Stop reason: SDUPTHER

## 2018-07-01 RX ORDER — POTASSIUM CHLORIDE 750 MG/1
10 CAPSULE, EXTENDED RELEASE ORAL 2 TIMES DAILY
Refills: 3 | COMMUNITY
Start: 2018-06-11 | End: 2019-02-25 | Stop reason: SDUPTHER

## 2018-07-01 RX ORDER — AZITHROMYCIN 250 MG/1
250 TABLET, FILM COATED ORAL DAILY
COMMUNITY
End: 2018-08-14 | Stop reason: SDUPTHER

## 2018-07-17 DIAGNOSIS — F51.04 CHRONIC INSOMNIA: ICD-10-CM

## 2018-07-17 DIAGNOSIS — E78.49 OTHER HYPERLIPIDEMIA: ICD-10-CM

## 2018-07-17 DIAGNOSIS — E11.59 TYPE 2 DIABETES MELLITUS WITH OTHER CIRCULATORY COMPLICATION, WITHOUT LONG-TERM CURRENT USE OF INSULIN (HCC): ICD-10-CM

## 2018-07-17 RX ORDER — ROSUVASTATIN CALCIUM 10 MG/1
TABLET, COATED ORAL
Qty: 90 TABLET | Refills: 0 | Status: SHIPPED | OUTPATIENT
Start: 2018-07-17 | End: 2019-02-13 | Stop reason: SDUPTHER

## 2018-07-17 RX ORDER — SITAGLIPTIN 100 MG/1
TABLET, FILM COATED ORAL
Qty: 90 TABLET | Refills: 0 | Status: SHIPPED | OUTPATIENT
Start: 2018-07-17 | End: 2018-08-13 | Stop reason: SDUPTHER

## 2018-07-17 RX ORDER — TRAZODONE HYDROCHLORIDE 50 MG/1
TABLET ORAL
Qty: 180 TABLET | Refills: 0 | Status: SHIPPED | OUTPATIENT
Start: 2018-07-17 | End: 2018-08-03 | Stop reason: SDUPTHER

## 2018-07-17 RX ORDER — MIRTAZAPINE 15 MG/1
TABLET, FILM COATED ORAL
Qty: 90 TABLET | Refills: 0 | Status: SHIPPED | OUTPATIENT
Start: 2018-07-17 | End: 2018-08-03 | Stop reason: SDUPTHER

## 2018-07-23 DIAGNOSIS — J44.9 CHRONIC OBSTRUCTIVE PULMONARY DISEASE, UNSPECIFIED COPD TYPE (HCC): ICD-10-CM

## 2018-07-23 RX ORDER — PREDNISONE 10 MG/1
TABLET ORAL
Qty: 90 TABLET | Refills: 1 | Status: CANCELLED | OUTPATIENT
Start: 2018-07-23

## 2018-08-03 DIAGNOSIS — F51.04 CHRONIC INSOMNIA: ICD-10-CM

## 2018-08-03 DIAGNOSIS — F41.9 ANXIETY: Primary | ICD-10-CM

## 2018-08-03 RX ORDER — BUSPIRONE HYDROCHLORIDE 15 MG/1
TABLET ORAL
Qty: 270 TABLET | Refills: 0 | Status: SHIPPED | OUTPATIENT
Start: 2018-08-03 | End: 2018-08-04 | Stop reason: SDUPTHER

## 2018-08-03 RX ORDER — MIRTAZAPINE 15 MG/1
TABLET, FILM COATED ORAL
Qty: 90 TABLET | Refills: 0 | Status: SHIPPED | OUTPATIENT
Start: 2018-08-03 | End: 2018-12-05 | Stop reason: DRUGHIGH

## 2018-08-03 RX ORDER — TRAZODONE HYDROCHLORIDE 50 MG/1
TABLET ORAL
Qty: 180 TABLET | Refills: 0 | Status: SHIPPED | OUTPATIENT
Start: 2018-08-03

## 2018-08-04 DIAGNOSIS — F41.9 ANXIETY: ICD-10-CM

## 2018-08-06 RX ORDER — BUSPIRONE HYDROCHLORIDE 15 MG/1
TABLET ORAL
Qty: 270 TABLET | Refills: 0 | Status: SHIPPED | OUTPATIENT
Start: 2018-08-06 | End: 2018-08-13 | Stop reason: SDUPTHER

## 2018-08-13 DIAGNOSIS — F41.9 ANXIETY: ICD-10-CM

## 2018-08-13 DIAGNOSIS — E11.59 TYPE 2 DIABETES MELLITUS WITH OTHER CIRCULATORY COMPLICATION, WITHOUT LONG-TERM CURRENT USE OF INSULIN (HCC): ICD-10-CM

## 2018-08-13 RX ORDER — SITAGLIPTIN 100 MG/1
TABLET, FILM COATED ORAL
Qty: 90 TABLET | Refills: 0 | Status: SHIPPED | OUTPATIENT
Start: 2018-08-13 | End: 2018-08-24 | Stop reason: SDUPTHER

## 2018-08-13 RX ORDER — BUSPIRONE HYDROCHLORIDE 15 MG/1
TABLET ORAL
Qty: 270 TABLET | Refills: 0 | Status: SHIPPED | OUTPATIENT
Start: 2018-08-13 | End: 2018-09-14 | Stop reason: SDUPTHER

## 2018-08-14 ENCOUNTER — TELEPHONE (OUTPATIENT)
Dept: FAMILY MEDICINE CLINIC | Facility: CLINIC | Age: 73
End: 2018-08-14

## 2018-08-14 DIAGNOSIS — J41.1 MUCOPURULENT CHRONIC BRONCHITIS (HCC): Primary | ICD-10-CM

## 2018-08-14 RX ORDER — AZITHROMYCIN 250 MG/1
250 TABLET, FILM COATED ORAL EVERY 24 HOURS
Qty: 90 TABLET | Refills: 0 | Status: SHIPPED | OUTPATIENT
Start: 2018-08-14 | End: 2018-09-06 | Stop reason: SDUPTHER

## 2018-08-14 RX ORDER — TIOTROPIUM BROMIDE INHALATION SPRAY 3.12 UG/1
1 SPRAY, METERED RESPIRATORY (INHALATION) DAILY
COMMUNITY
Start: 2018-07-25 | End: 2019-05-24 | Stop reason: SDUPTHER

## 2018-08-14 NOTE — TELEPHONE ENCOUNTER
[facility] Pulmonologist retired, Tomy Kohler has appt to see you and talk about this on 8/24  She is asking if you will prescribe the azithromycin for him to [facility] 90 day supply      Let Tomy Kohler know [phone]

## 2018-08-24 DIAGNOSIS — J44.9 CHRONIC OBSTRUCTIVE PULMONARY DISEASE, UNSPECIFIED COPD TYPE (HCC): ICD-10-CM

## 2018-08-24 DIAGNOSIS — J96.11 CHRONIC HYPOXEMIC RESPIRATORY FAILURE (HCC): Primary | ICD-10-CM

## 2018-08-24 DIAGNOSIS — E11.59 TYPE 2 DIABETES MELLITUS WITH OTHER CIRCULATORY COMPLICATION, WITHOUT LONG-TERM CURRENT USE OF INSULIN (HCC): ICD-10-CM

## 2018-08-24 DIAGNOSIS — IMO0002 UNCONTROLLED TYPE 2 DIABETES MELLITUS WITH OTHER SPECIFIED COMPLICATION, WITHOUT LONG-TERM CURRENT USE OF INSULIN: ICD-10-CM

## 2018-08-24 DIAGNOSIS — F41.9 ANXIETY: ICD-10-CM

## 2018-08-24 RX ORDER — PREDNISONE 1 MG/1
5 TABLET ORAL DAILY
Qty: 90 TABLET | Refills: 0 | Status: SHIPPED | OUTPATIENT
Start: 2018-08-24 | End: 2018-12-14 | Stop reason: SDUPTHER

## 2018-08-24 RX ORDER — PREDNISONE 10 MG/1
10 TABLET ORAL DAILY
Qty: 90 TABLET | Refills: 0 | Status: SHIPPED | OUTPATIENT
Start: 2018-08-24 | End: 2018-09-06 | Stop reason: SDUPTHER

## 2018-08-24 RX ORDER — LORAZEPAM 0.5 MG/1
0.5 TABLET ORAL 4 TIMES DAILY PRN
Qty: 120 TABLET | Refills: 0 | Status: SHIPPED | OUTPATIENT
Start: 2018-08-24 | End: 2018-11-16 | Stop reason: SDUPTHER

## 2018-08-24 RX ORDER — BUDESONIDE 0.25 MG/2ML
0.25 INHALANT ORAL DAILY
Qty: 90 AMPULE | Refills: 0 | Status: SHIPPED | OUTPATIENT
Start: 2018-08-24 | End: 2018-09-07 | Stop reason: SDUPTHER

## 2018-09-06 ENCOUNTER — TELEPHONE (OUTPATIENT)
Dept: FAMILY MEDICINE CLINIC | Facility: CLINIC | Age: 73
End: 2018-09-06

## 2018-09-06 DIAGNOSIS — J44.9 CHRONIC OBSTRUCTIVE PULMONARY DISEASE, UNSPECIFIED COPD TYPE (HCC): ICD-10-CM

## 2018-09-06 DIAGNOSIS — IMO0002 UNCONTROLLED TYPE 2 DIABETES MELLITUS WITH OTHER SPECIFIED COMPLICATION, WITHOUT LONG-TERM CURRENT USE OF INSULIN: ICD-10-CM

## 2018-09-06 DIAGNOSIS — E78.49 OTHER HYPERLIPIDEMIA: ICD-10-CM

## 2018-09-06 DIAGNOSIS — J41.1 MUCOPURULENT CHRONIC BRONCHITIS (HCC): ICD-10-CM

## 2018-09-06 DIAGNOSIS — F51.04 CHRONIC INSOMNIA: ICD-10-CM

## 2018-09-06 NOTE — TELEPHONE ENCOUNTER
Daughter Mey Wood is asking if Dad can get refills on the meds that were filled on 08/24  She thought that he would have gotten refills

## 2018-09-07 DIAGNOSIS — J96.11 CHRONIC HYPOXEMIC RESPIRATORY FAILURE (HCC): ICD-10-CM

## 2018-09-07 DIAGNOSIS — J44.9 CHRONIC OBSTRUCTIVE PULMONARY DISEASE, UNSPECIFIED COPD TYPE (HCC): ICD-10-CM

## 2018-09-07 RX ORDER — ROSUVASTATIN CALCIUM 10 MG/1
TABLET, COATED ORAL
Qty: 90 TABLET | Refills: 0 | Status: SHIPPED | OUTPATIENT
Start: 2018-09-07 | End: 2018-11-23 | Stop reason: SDUPTHER

## 2018-09-07 RX ORDER — TRAZODONE HYDROCHLORIDE 50 MG/1
TABLET ORAL
Qty: 180 TABLET | Refills: 0 | Status: SHIPPED | OUTPATIENT
Start: 2018-09-07

## 2018-09-07 RX ORDER — PREDNISONE 10 MG/1
TABLET ORAL
Qty: 90 TABLET | Refills: 0 | Status: SHIPPED | OUTPATIENT
Start: 2018-09-07 | End: 2018-12-14 | Stop reason: SDUPTHER

## 2018-09-07 RX ORDER — AZITHROMYCIN 250 MG/1
TABLET, FILM COATED ORAL
Qty: 90 TABLET | Refills: 0 | Status: SHIPPED | OUTPATIENT
Start: 2018-09-07 | End: 2018-12-14 | Stop reason: SDUPTHER

## 2018-09-07 RX ORDER — BUDESONIDE 0.25 MG/2ML
INHALANT ORAL
Qty: 180 ML | Refills: 0 | Status: SHIPPED | OUTPATIENT
Start: 2018-09-07

## 2018-09-07 NOTE — TELEPHONE ENCOUNTER
I thought she said when she was here at her visit to discuss her father that she was going to bring him in for a visit since it has been over 1 year since he has been here in the office

## 2018-09-11 DIAGNOSIS — J96.11 CHRONIC HYPOXEMIC RESPIRATORY FAILURE (HCC): ICD-10-CM

## 2018-09-11 DIAGNOSIS — J44.9 CHRONIC OBSTRUCTIVE PULMONARY DISEASE, UNSPECIFIED COPD TYPE (HCC): ICD-10-CM

## 2018-09-14 DIAGNOSIS — F41.9 ANXIETY: ICD-10-CM

## 2018-09-14 RX ORDER — BUSPIRONE HYDROCHLORIDE 15 MG/1
TABLET ORAL
Qty: 270 TABLET | Refills: 0 | Status: SHIPPED | OUTPATIENT
Start: 2018-09-14

## 2018-09-19 DIAGNOSIS — J44.9 CHRONIC OBSTRUCTIVE PULMONARY DISEASE, UNSPECIFIED COPD TYPE (HCC): ICD-10-CM

## 2018-09-19 DIAGNOSIS — J96.11 CHRONIC HYPOXEMIC RESPIRATORY FAILURE (HCC): ICD-10-CM

## 2018-09-24 DIAGNOSIS — J44.9 CHRONIC OBSTRUCTIVE PULMONARY DISEASE, UNSPECIFIED COPD TYPE (HCC): ICD-10-CM

## 2018-09-24 RX ORDER — PREDNISONE 10 MG/1
TABLET ORAL
Qty: 45 TABLET | Refills: 2 | Status: SHIPPED | OUTPATIENT
Start: 2018-09-24

## 2018-10-02 DIAGNOSIS — J44.9 CHRONIC OBSTRUCTIVE PULMONARY DISEASE, UNSPECIFIED COPD TYPE (HCC): Primary | ICD-10-CM

## 2018-10-02 RX ORDER — ALBUTEROL SULFATE 90 UG/1
2 AEROSOL, METERED RESPIRATORY (INHALATION) EVERY 4 HOURS PRN
Qty: 6 INHALER | Refills: 2 | Status: SHIPPED | OUTPATIENT
Start: 2018-10-02 | End: 2019-07-16 | Stop reason: SDUPTHER

## 2018-11-16 DIAGNOSIS — F41.9 ANXIETY: ICD-10-CM

## 2018-11-16 RX ORDER — LORAZEPAM 0.5 MG/1
0.5 TABLET ORAL 4 TIMES DAILY PRN
Qty: 120 TABLET | Refills: 0 | Status: SHIPPED | OUTPATIENT
Start: 2018-11-16 | End: 2019-01-28 | Stop reason: SDUPTHER

## 2018-11-23 ENCOUNTER — OFFICE VISIT (OUTPATIENT)
Dept: FAMILY MEDICINE CLINIC | Facility: CLINIC | Age: 73
End: 2018-11-23
Payer: MEDICARE

## 2018-11-23 VITALS
HEIGHT: 67 IN | WEIGHT: 140 LBS | DIASTOLIC BLOOD PRESSURE: 58 MMHG | SYSTOLIC BLOOD PRESSURE: 102 MMHG | HEART RATE: 102 BPM | TEMPERATURE: 98.5 F | OXYGEN SATURATION: 98 % | BODY MASS INDEX: 21.97 KG/M2

## 2018-11-23 DIAGNOSIS — R31.9 HEMATURIA, UNSPECIFIED TYPE: ICD-10-CM

## 2018-11-23 DIAGNOSIS — D50.8 IRON DEFICIENCY ANEMIA SECONDARY TO INADEQUATE DIETARY IRON INTAKE: ICD-10-CM

## 2018-11-23 DIAGNOSIS — R11.0 NAUSEA: ICD-10-CM

## 2018-11-23 DIAGNOSIS — N30.01 ACUTE CYSTITIS WITH HEMATURIA: ICD-10-CM

## 2018-11-23 DIAGNOSIS — Z00.00 MEDICARE ANNUAL WELLNESS VISIT, SUBSEQUENT: Primary | ICD-10-CM

## 2018-11-23 DIAGNOSIS — E11.59 TYPE 2 DIABETES MELLITUS WITH OTHER CIRCULATORY COMPLICATION, WITHOUT LONG-TERM CURRENT USE OF INSULIN (HCC): ICD-10-CM

## 2018-11-23 DIAGNOSIS — Z12.11 COLON CANCER SCREENING: ICD-10-CM

## 2018-11-23 PROBLEM — E11.9 DIABETES MELLITUS (HCC): Status: ACTIVE | Noted: 2018-11-23

## 2018-11-23 LAB
SL AMB  POCT GLUCOSE, UA: 250
SL AMB LEUKOCYTE ESTERASE,UA: ABNORMAL
SL AMB POCT BILIRUBIN,UA: NEGATIVE
SL AMB POCT BLOOD,UA: ABNORMAL
SL AMB POCT CLARITY,UA: ABNORMAL
SL AMB POCT COLOR,UA: ABNORMAL
SL AMB POCT HEMOGLOBIN AIC: 7.5
SL AMB POCT KETONES,UA: NEGATIVE
SL AMB POCT NITRITE,UA: NEGATIVE
SL AMB POCT PH,UA: 5
SL AMB POCT SPECIFIC GRAVITY,UA: 1.01
SL AMB POCT URINE PROTEIN: ABNORMAL
SL AMB POCT UROBILINOGEN: 0.2

## 2018-11-23 PROCEDURE — 99214 OFFICE O/P EST MOD 30 MIN: CPT | Performed by: FAMILY MEDICINE

## 2018-11-23 PROCEDURE — 83036 HEMOGLOBIN GLYCOSYLATED A1C: CPT | Performed by: FAMILY MEDICINE

## 2018-11-23 PROCEDURE — 81002 URINALYSIS NONAUTO W/O SCOPE: CPT | Performed by: FAMILY MEDICINE

## 2018-11-23 PROCEDURE — G0439 PPPS, SUBSEQ VISIT: HCPCS | Performed by: FAMILY MEDICINE

## 2018-11-23 RX ORDER — LOSARTAN POTASSIUM 50 MG/1
50 TABLET ORAL DAILY
Refills: 3 | COMMUNITY
Start: 2018-10-17 | End: 2018-11-23 | Stop reason: ALTCHOICE

## 2018-11-23 RX ORDER — ONDANSETRON 4 MG/1
4 TABLET, ORALLY DISINTEGRATING ORAL EVERY 8 HOURS
Qty: 20 TABLET | Refills: 2 | Status: SHIPPED | OUTPATIENT
Start: 2018-11-23 | End: 2019-02-04 | Stop reason: SDUPTHER

## 2018-11-23 RX ORDER — ONDANSETRON 4 MG/1
4 TABLET, ORALLY DISINTEGRATING ORAL EVERY 8 HOURS
Qty: 20 TABLET | Refills: 0 | Status: SHIPPED | OUTPATIENT
Start: 2018-11-23 | End: 2018-11-23 | Stop reason: SDUPTHER

## 2018-11-23 RX ORDER — CIPROFLOXACIN 500 MG/1
500 TABLET, FILM COATED ORAL EVERY 12 HOURS SCHEDULED
Qty: 14 TABLET | Refills: 0 | Status: SHIPPED | OUTPATIENT
Start: 2018-11-23 | End: 2018-11-30

## 2018-11-23 NOTE — PROGRESS NOTES
Assessment and Plan:    Problem List Items Addressed This Visit     None        Health Maintenance Due   Topic Date Due    SLP PLAN OF CARE  1945    CRC Screening: Colonoscopy  1945    DTaP,Tdap,and Td Vaccines (1 - Tdap) 08/28/1965    Pneumococcal PPSV23/PCV13 65+ Years / Low and Medium Risk (2 of 2 - PPSV23) 01/02/2016    DM Eye Exam  02/09/2016    URINE MICROALBUMIN  05/03/2017    INFLUENZA VACCINE  07/01/2018    Fall Risk  08/22/2018    Diabetic Foot Exam  08/22/2018    HEMOGLOBIN A1C  11/18/2018         HPI:  Bud Price is a 75 y o  male here for his Subsequent Wellness Visit      Patient Active Problem List   Diagnosis    Benign prostatic hyperplasia    Depression    Erectile dysfunction of non-organic origin    Insomnia    Chronic fatigue    Abdominal aortic aneurysm without rupture (HCC)    Allergic rhinitis    Anemia    Anxiety disorder    Benign essential hypertension    CAD (coronary artery disease)    Chronic hypoxemic respiratory failure (HCC)    Chronic obstructive pulmonary disease (HCC)    Diabetes type 2, uncontrolled ([address] )    Hyperlipidemia    Hyperthyroidism    Hypoxemia    Urinary incontinence    Vascular dementia, uncomplicated    Aneurysm of thoracic aorta (HCC)    Cerebral atherosclerosis    ED (erectile dysfunction) of organic origin    Peripheral vascular disease ([address] )    Pulmonary nodules    S/P AAA repair    Transient cerebral ischemia    Mucopurulent chronic bronchitis ([facility] )     Past Medical History:   Diagnosis Date    Anaphylaxis     last assessed 10/24/2012    Angioedema     last assessed 10/24/2012    Myocardial infarction [facility]) 1994    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     last assessed 04/17/2013    SOB (shortness of breath)     last assessed 10/20/2012    Transient cerebral ischemia     onset 11/02/2011    Type 2 diabetes mellitus, controlled ([address] )     last assessed 09/14/2016     Past Surgical History:   Procedure Laterality Date    ABDOMINAL AORTIC ANEURYSM REPAIR  08/09/2007    for dilation or occlusion    ANGIOPLASTY  1994    PALATOPHARYNGOPLASTY       No family history on file    History   Smoking Status    Current Every Day Smoker   Smokeless Tobacco    Current User     Comment: non nicotine vapor product user     History   Alcohol use Not on file      History   Drug use: Unknown       Current Outpatient Prescriptions   Medication Sig Dispense Refill    albuterol (2 5 mg/3 mL) 0 083 % nebulizer solution       albuterol (PROAIR HFA) 90 mcg/act inhaler Inhale 2 puffs every 4 (four) hours as needed for wheezing 6 Inhaler 2    azithromycin (ZITHROMAX) 250 mg tablet TAKE 1 TABLET EVERY 24     HOURS 90 tablet 0    budesonide (PULMICORT) 0 25 mg/2 mL nebulizer solution INHALE THE CONTENTS OF 1   VIAL BY NEBULIZATION DAILY 180 mL 0    busPIRone (BUSPAR) 15 mg tablet TAKE 1 TABLET 3 TIMES A  tablet 0    Cetirizine HCl (ZYRTEC ALLERGY) 10 MG CAPS Take 1 tablet by mouth daily      Cholecalciferol 5000 units TABS Take 5,000 Units by mouth      clopidogrel (PLAVIX) 75 mg tablet Take 75 mg by mouth      COMBIVENT RESPIMAT inhaler USE 1 INHALATION ORALLY 4  TIMES DAILY (MAXIMUM OF 6  INHALATIONS IN 24 HOURS) 9 Inhaler 2    cyclobenzaprine (FLEXERIL) 10 mg tablet Take by mouth      Dapagliflozin Propanediol (FARXIGA) 10 MG TABS Take 1 tablet (10 mg total) by mouth daily 90 tablet 3    diltiazem (CARDIZEM CD) 240 mg 24 hr capsule TAKE 1 CAPSULE TWICE DAILY      EPINEPHrine (EPIPEN) 0 3 mg/0 3 mL SOAJ Inject as directed      Ferrous Sulfate 134 MG TABS Take 2 tablets by mouth      finasteride (PROSCAR) 5 mg tablet Take by mouth      fluticasone (FLONASE) 50 mcg/act nasal spray 2 sprays into each nostril      fluticasone-salmeterol (ADVAIR DISKUS) 500-50 mcg/dose inhaler Inhale 1 puff 2 (two) times a day 3 Inhaler 0    folic acid (FOLVITE) 834 mcg tablet Take 400 mcg by mouth      furosemide (LASIX) 40 mg tablet       guaiFENesin 400 mg Take 1 tablet by mouth every 8 (eight) hours as needed      ipratropium-albuterol (COMBIVENT RESPIMAT) inhaler Inhale      levalbuterol (XOPENEX) 1 25 mg/3 mL nebulizer solution Inhale 1 ampule      LORazepam (ATIVAN) 0 5 mg tablet Take 1 tablet (0 5 mg total) by mouth 4 (four) times a day as needed for anxiety 120 tablet 0    losartan (COZAAR) 50 mg tablet Take 50 mg by mouth daily  3    metFORMIN (GLUCOPHAGE) 1000 MG tablet TAKE 1 TABLET WITH         BREAKFAST, 1/2 TABLET WITH LUNCH, AND 1 TABLET WITH   DINNER 225 tablet 0    mirtazapine (REMERON) 15 mg tablet TAKE 1 TABLET AT BEDTIME 90 tablet 0    ondansetron (ZOFRAN-ODT) 4 mg disintegrating tablet Take 1 tablet by mouth every 8 (eight) hours      oseltamivir (TAMIFLU) 75 mg capsule Take 1 capsule by mouth 2 (two) times a day      patient supplied medication 4 L by Does not apply route      potassium chloride (KLOR-CON 10) 10 mEq tablet Take 1 tablet by mouth 2 (two) times a day      potassium chloride (MICRO-K) 10 MEQ CR capsule Take 10 mEq by mouth 2 (two) times a day  3    predniSONE 10 mg tablet TAKE 1 TABLET DAILY        COMBINING WITH 5MG DAILY 90 tablet 0    predniSONE 10 mg tablet TAKE HALF A TABLET BY MOUTH DAILY 45 tablet 2    predniSONE 5 mg tablet Take 1 tablet (5 mg total) by mouth daily Combining with 10mg tab daily 90 tablet 0    rosuvastatin (CRESTOR) 10 MG tablet TAKE 1 TABLET AT BEDTIME 90 tablet 0    rosuvastatin (CRESTOR) 10 MG tablet TAKE 1 TABLET AT BEDTIME 90 tablet 0    sitaGLIPtin (JANUVIA) 100 mg tablet Take 1 tablet by mouth daily      sitaGLIPtin (JANUVIA) 100 mg tablet Take 1 tablet (100 mg total) by mouth daily 90 tablet 0    SPIRIVA RESPIMAT 2 5 MCG/ACT AERS inhaler Inhale 1 capsule daily      tadalafil (CIALIS) 5 MG tablet Take by mouth      traZODone (DESYREL) 50 mg tablet TAKE 2 TABLETS AT BEDTIME 180 tablet 0    traZODone (DESYREL) 50 mg tablet TAKE 2 TABLETS AT BEDTIME 180 tablet 0    valsartan (DIOVAN) 80 mg tablet Take 40 mg by mouth       No current facility-administered medications for this visit  Allergies   Allergen Reactions    Silodosin Anaphylaxis     Other reaction(s): hypotention, periorbital edema  Other reaction(s): hypotention, periorbital edema  Category: Allergy;     Alprazolam      Other reaction(s): double vision    Bupropion      Other reaction(s): INTOLERANT/JITTERS  Reaction Date: [id number]; Category: Adverse Reaction;     Enalapril      Other reaction(s): INTOLERANT  Reaction Date: [id number]; Category: Adverse Reaction;     Tamsulosin      Other reaction(s): lightheaded and dizziness  Other reaction(s): lightheaded and dizziness  Other reaction(s): low blood pressure  Category: Adverse Reaction;      Immunization History   Administered Date(s) Administered    Influenza 01/23/2013, 09/01/2016    Influenza Split High Dose Preservative Free IM 09/01/2016    Influenza TIV (IM) 11/17/2012, 10/15/2013    Pneumococcal 10/01/2015    Pneumococcal Conjugate 13-Valent 01/02/2015    Pneumococcal Conjugate PCV 7 10/01/2015    Pneumococcal Polysaccharide PPV23 11/01/2001, 11/11/2008       Patient Care Team:  Cassie Wilcox DO as PCP - General  MD Cassie Sevilla, MD Hari Davenport MD    Medicare Screening Tests and Risk Assessments:  Mustapha Solo is here for his Subsequent Wellness visit  Last Medicare Wellness visit information reviewed, patient interviewed, no change since last AWV  Health Risk Assessment:  Patient rates overall health as poor  Patient feels that their physical health rating is Slightly better  Eyesight was rated as Slightly worse  Hearing was rated as Same  Patient feels that their emotional and mental health rating is Same  Pain experienced by patient in the last 7 days has been Some  Patient's pain rating has been 4/10       Emotional/Mental Health:  Patient has been feeling nervous/anxious  PHQ-9 Depression Screening:    Frequency of the following problems over the past two weeks:      1  Little interest or pleasure in doing things: 0 - not at all      2  Feeling down, depressed, or hopeless: 2 - more than half the days      3  Trouble falling or staying asleep, or sleeping too much: 2 - more than half the days      4  Feeling tired or having little energy: 3 - nearly every day      5  Poor appetite or overeatin - more than half the days      6  Feeling bad about yourself - or that you are a failure or have let yourself or your family down: 1 - several days      7  Trouble concentrating on things, such as reading the newspaper or watching television: 1 - several days      8  Moving or speaking so slowly that other people could have noticed  Or the opposite - being so fidgety or restless that you have been moving around a lot more than usual: 0 - not at all      9  Thoughts that you would be better off dead, or of hurting yourself in some way: 0 - not at all  PHQ-2 Score: 2  PHQ-9 Score: 11    Broken Bones/Falls: Fall Risk Assessment:    In the past year, patient has experienced: History of falling in past year     Number of falls: 1          Bladder/Bowel:  Patient has not leaked urine accidently in the last six months  Patient reports no loss of bowel control  Immunizations:  Patient has had a flu vaccination within the last year  Patient has received a pneumonia shot  Patient has not received a shingles shot  Patient has not received tetanus/diphtheria shot  Home Safety:  Patient has trouble with stairs inside or outside of their home  Patient currently reports that there are working smoke alarms, working carbon monoxide detectors        Preventative Screenings:   prostate cancer screen performed, colon cancer screen completed, cholesterol screen completed, glaucoma eye exam completed,     Nutrition:  Current diet: Regular and Limited junk food with servings of the following:    Medications:  Patient is currently taking over-the-counter supplements  Patient is not able to manage medications  Lifestyle Choices:  Patient reports no tobacco use  Patient has smoked or used tobacco in the past   Patient drives a vehicle  Patient wears seat belt  Activities of Daily Living:  Can get out of bed by his or her self, able to dress self, able to make own meals, unable to do own shopping, able to bathe self, unable to do laundry/housekeeping, can manage own money, pay bills and track expenses    Previous Hospitalizations:  No hospitalization or ED visit in past 12 months        Advanced Directives:  Patient has decided on a power of   Patient has spoken to designated power of   Patient has completed advanced directive  Preventative Screening/Counseling:      Cardiovascular:      General: Screening Current          Diabetes:      General: Screening Current          Colorectal Cancer:      General: Risks and Benefits Discussed      Due for studies: Fecal Occult Blood          Prostate Cancer:      General: Patient Declines          Osteoporosis:      General: Screening Not Indicated          AAA:      General: Screening Current          Glaucoma:      General: Screening Current          HIV:      General: Screening Not Indicated          Hepatitis C:      General: Screening Not Indicated        Advanced Directives:   Patient has living will for healthcare, has durable POA for healthcare, patient has an advanced directive  Information on ACP and/or AD provided  5 wishes given  End of life assessment reviewed with patient  Provider agrees with end of life decisions        Immunizations:      Influenza: Influenza UTD This Year      Pneumococcal: Lifetime Vaccine Completed      Shingrix: Patient Declines      Hepatitis B (Low risk patients): Series Not Indicated      Hepatitis B (Medium to high risk patients): Patient Declines      Zostavax: Patient Declines      TD: Patient Declines      TDAP: Patient Declines

## 2018-11-23 NOTE — PROGRESS NOTES
Subjective:   Chief Complaint   Patient presents with   [facility] Wellness Visit     pt is here for subsequent annual wellness  Patient ID: Leonardo Paul is a 75 y o  male  Pt here with his daughter for medicare wellness and follow up on chronic conditions  Pt now lives in [place], living with his girlfriend  Feeling nauseated frequently  Pt bp was running low  Cardiology adjusted his bp medication just recently  Using ondansetron for nausea as needed  Has lost 16 pounds in the past 6 months  ABLE TO EAT BUT DECREASED APPETITE   BMS NORMAL  On 4L oxygen  Had foot exam in office today  Sees urologist, left urine sample that shows blood and leukocytes  Some burn with urination  Had turp about 1 year ago  Gets intermittent blood in urine  Was going to get cystoscopy but stated blood in urine stopped but today states that it has been intermittent  No fevers  The following portions of the patient's history were reviewed and updated as appropriate: allergies, current medications, past family history, past medical history, past social history, past surgical history and problem list     Review of Systems   Constitutional: Positive for appetite change and fatigue  Negative for fever  HENT: Negative  Eyes: Negative  Respiratory: Positive for cough and shortness of breath  Cardiovascular: Negative  Gastrointestinal: Negative  Endocrine: Negative  Genitourinary: Positive for dysuria and hematuria  Musculoskeletal: Negative  Skin: Negative  Allergic/Immunologic: Negative  Neurological: Negative  Psychiatric/Behavioral: Positive for dysphoric mood and sleep disturbance  The patient is nervous/anxious  Objective:  Vitals:    11/23/18 1605   BP: 102/58   Pulse: 102   Temp: 98 5 °F (36 9 °C)   SpO2: 98%   Weight: 63 5 kg (140 lb)   Height: 5' 7" (1 702 m)      Physical Exam   Constitutional: He is oriented to person, place, and time   He appears well-developed and well-nourished  HENT:   Head: Normocephalic and atraumatic  Cardiovascular: Normal rate, regular rhythm and normal heart sounds  Pulses are weak pulses  Pulses:       Dorsalis pedis pulses are 1+ on the right side, and 1+ on the left side  Posterior tibial pulses are 1+ on the right side, and 1+ on the left side  Pulmonary/Chest: Effort normal and breath sounds normal    No distress on 4L oxygen   Abdominal: Soft  Bowel sounds are normal    Feet:   Right Foot:   Skin Integrity: Negative for ulcer, skin breakdown, erythema, warmth, callus or dry skin  Left Foot:   Skin Integrity: Negative for ulcer, skin breakdown, erythema, warmth, callus or dry skin  Neurological: He is alert and oriented to person, place, and time  Skin: Skin is warm and dry  Psychiatric: He has a normal mood and affect  His behavior is normal  Judgment and thought content normal    Nursing note and vitals reviewed  Patient's shoes and socks removed  Right Foot/Ankle   Right Foot Inspection  Skin Exam: skin normal and skin intact no dry skin, no warmth, no callus, no erythema, no maceration, no abnormal color, no pre-ulcer, no ulcer and no callus                          Toe Exam: ROM and strength within normal limits  Sensory   Vibration: intact  Proprioception: intact   Monofilament testing: intact  Vascular  Capillary refills: elevated  The right DP pulse is 1+  The right PT pulse is 1+  Left Foot/Ankle  Left Foot Inspection  Skin Exam: skin normal and skin intactno dry skin, no warmth, no erythema, no maceration, normal color, no pre-ulcer, no ulcer and no callus                         Toe Exam: ROM and strength within normal limits                   Sensory   Vibration: intact  Proprioception: intact  Monofilament: intact  Vascular  Capillary refills: elevated  The left DP pulse is 1+  The left PT pulse is 1+  Assign Risk Category:  No deformity present;  No loss of protective sensation; Weak pulses Risk: 1    Assessment/Plan:    No problem-specific Assessment & Plan notes found for this encounter  Diagnoses and all orders for this visit:    Medicare annual wellness visit, subsequent    Acute cystitis with hematuria: cipro 1 tab twice a day, send urine for culture  -     ciprofloxacin (CIPRO) 500 mg tablet; Take 1 tablet (500 mg total) by mouth every 12 (twelve) hours for 7 days    Nausea: ondansetron as needed, may be related to hypotension  -     Discontinue: ondansetron (ZOFRAN-ODT) 4 mg disintegrating tablet; Take 1 tablet (4 mg total) by mouth every 8 (eight) hours  -     ondansetron (ZOFRAN-ODT) 4 mg disintegrating tablet; Take 1 tablet (4 mg total) by mouth every 8 (eight) hours    Type 2 diabetes mellitus with other circulatory complication, without long-term current use of insulin ([facility] ): hba1c 7 5 today, significant improvement  -     POCT hemoglobin A1c    Iron deficiency anemia secondary to inadequate dietary iron intake: low h/h, pt taking iron daily, will need iron and ferritin levels  -     Iron; Future  -     Ferritin; Future    Hematuria, unspecified type  -     POCT urine dip  -     Urine culture    Colon cancer screening  -     Fecal Globin By Immunochemistry; Future  -     Fecal Globin By Immunochemistry    Other orders  -     Discontinue: losartan (COZAAR) 50 mg tablet; Take 50 mg by mouth daily  -     Cancel: Cologuard;  Future

## 2018-12-05 ENCOUNTER — TELEPHONE (OUTPATIENT)
Dept: FAMILY MEDICINE CLINIC | Facility: CLINIC | Age: 73
End: 2018-12-05

## 2018-12-05 DIAGNOSIS — F51.04 CHRONIC INSOMNIA: ICD-10-CM

## 2018-12-05 DIAGNOSIS — F33.1 MODERATE EPISODE OF RECURRENT MAJOR DEPRESSIVE DISORDER (HCC): Primary | ICD-10-CM

## 2018-12-05 DIAGNOSIS — F51.04 CHRONIC INSOMNIA: Primary | ICD-10-CM

## 2018-12-05 DIAGNOSIS — F33.1 MODERATE EPISODE OF RECURRENT MAJOR DEPRESSIVE DISORDER (HCC): ICD-10-CM

## 2018-12-05 RX ORDER — MIRTAZAPINE 30 MG/1
15 TABLET, FILM COATED ORAL
Qty: 90 TABLET | Refills: 0 | Status: SHIPPED | OUTPATIENT
Start: 2018-12-05 | End: 2018-12-05 | Stop reason: DRUGHIGH

## 2018-12-05 RX ORDER — MIRTAZAPINE 30 MG/1
30 TABLET, FILM COATED ORAL
Qty: 90 TABLET | Refills: 0 | Status: SHIPPED | OUTPATIENT
Start: 2018-12-05 | End: 2019-02-06 | Stop reason: SDUPTHER

## 2018-12-05 NOTE — TELEPHONE ENCOUNTER
Patients daughter called, she states that her father is still loosing weight and would like to know if Dr Von Whiteside could up his Mirtazapine from 15 mg to 45 mg     She states we need to send it to [facility]

## 2018-12-05 NOTE — TELEPHONE ENCOUNTER
I am not increasing the medication from 15 to 45   I will increase 15mg to 30mg  Resent to other pharmacy

## 2018-12-05 NOTE — TELEPHONE ENCOUNTER
Daughter called and the rx went to the wrong pharmacy  Can you resend  See note below  Also she wanted to make it 45 mg

## 2018-12-14 DIAGNOSIS — J96.11 CHRONIC HYPOXEMIC RESPIRATORY FAILURE (HCC): ICD-10-CM

## 2018-12-14 DIAGNOSIS — F51.04 CHRONIC INSOMNIA: ICD-10-CM

## 2018-12-14 DIAGNOSIS — E11.59 TYPE 2 DIABETES MELLITUS WITH OTHER CIRCULATORY COMPLICATION, WITHOUT LONG-TERM CURRENT USE OF INSULIN (HCC): ICD-10-CM

## 2018-12-14 DIAGNOSIS — J44.9 CHRONIC OBSTRUCTIVE PULMONARY DISEASE, UNSPECIFIED COPD TYPE (HCC): ICD-10-CM

## 2018-12-14 DIAGNOSIS — J41.1 MUCOPURULENT CHRONIC BRONCHITIS (HCC): ICD-10-CM

## 2018-12-14 RX ORDER — SITAGLIPTIN 100 MG/1
TABLET, FILM COATED ORAL
Qty: 90 TABLET | Refills: 0 | Status: SHIPPED | OUTPATIENT
Start: 2018-12-14

## 2018-12-14 RX ORDER — AZITHROMYCIN 250 MG/1
TABLET, FILM COATED ORAL
Qty: 90 TABLET | Refills: 0 | Status: SHIPPED | OUTPATIENT
Start: 2018-12-14 | End: 2019-02-23 | Stop reason: SDUPTHER

## 2018-12-14 RX ORDER — PREDNISONE 1 MG/1
TABLET ORAL
Qty: 90 TABLET | Refills: 0 | Status: SHIPPED | OUTPATIENT
Start: 2018-12-14 | End: 2019-02-23 | Stop reason: SDUPTHER

## 2018-12-14 RX ORDER — PREDNISONE 10 MG/1
TABLET ORAL
Qty: 90 TABLET | Refills: 0 | Status: SHIPPED | OUTPATIENT
Start: 2018-12-14 | End: 2019-02-13 | Stop reason: SDUPTHER

## 2018-12-14 RX ORDER — MIRTAZAPINE 15 MG/1
TABLET, FILM COATED ORAL
Qty: 90 TABLET | Refills: 0 | Status: SHIPPED | OUTPATIENT
Start: 2018-12-14 | End: 2019-02-04 | Stop reason: SDUPTHER

## 2018-12-17 ENCOUNTER — TELEPHONE (OUTPATIENT)
Dept: FAMILY MEDICINE CLINIC | Facility: CLINIC | Age: 73
End: 2018-12-17
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
## 2018-12-17 DIAGNOSIS — R58 ECCHYMOSIS: Primary | ICD-10-CM

## 2018-12-17 RX ORDER — VALSARTAN 80 MG/1
1 TABLET ORAL DAILY
COMMUNITY
Start: 2018-12-14

## 2018-12-17 RX ORDER — TRAMADOL HYDROCHLORIDE 50 MG/1
50 TABLET ORAL EVERY 8 HOURS PRN
Qty: 10 TABLET | Refills: 0 | Status: SHIPPED | OUTPATIENT
Start: 2018-12-17

## 2018-12-17 NOTE — TELEPHONE ENCOUNTER
Her father had a fall and bruised his right quad  Daughter wants to know if you can call in tramadol a very low dose and only 3 days worth  She understands if you can not do it

## 2018-12-31 DIAGNOSIS — Z79.4 TYPE 2 DIABETES MELLITUS WITH DIABETIC POLYNEUROPATHY, WITH LONG-TERM CURRENT USE OF INSULIN (HCC): ICD-10-CM

## 2018-12-31 DIAGNOSIS — J43.8 OTHER EMPHYSEMA (HCC): ICD-10-CM

## 2018-12-31 DIAGNOSIS — N30.01 ACUTE CYSTITIS WITH HEMATURIA: Primary | ICD-10-CM

## 2018-12-31 DIAGNOSIS — C67.9 MALIGNANT NEOPLASM OF URINARY BLADDER, UNSPECIFIED SITE (HCC): ICD-10-CM

## 2018-12-31 DIAGNOSIS — E11.42 TYPE 2 DIABETES MELLITUS WITH DIABETIC POLYNEUROPATHY, WITH LONG-TERM CURRENT USE OF INSULIN (HCC): ICD-10-CM

## 2018-12-31 RX ORDER — IPRATROPIUM BROMIDE AND ALBUTEROL SULFATE 2.5; .5 MG/3ML; MG/3ML
3 SOLUTION RESPIRATORY (INHALATION) 4 TIMES DAILY
Qty: 60 VIAL | Refills: 0 | Status: SHIPPED | OUTPATIENT
Start: 2018-12-31 | End: 2019-01-28 | Stop reason: SDUPTHER

## 2018-12-31 RX ORDER — OXYCODONE HYDROCHLORIDE AND ACETAMINOPHEN 5; 325 MG/1; MG/1
1 TABLET ORAL EVERY 4 HOURS PRN
Qty: 20 TABLET | Refills: 0 | Status: SHIPPED | OUTPATIENT
Start: 2018-12-31

## 2018-12-31 RX ORDER — CEFUROXIME AXETIL 250 MG/1
250 TABLET ORAL EVERY 12 HOURS SCHEDULED
Qty: 10 TABLET | Refills: 0 | Status: SHIPPED | OUTPATIENT
Start: 2018-12-31 | End: 2019-01-05

## 2019-01-03 ENCOUNTER — TELEPHONE (OUTPATIENT)
Dept: FAMILY MEDICINE CLINIC | Facility: CLINIC | Age: 74
End: 2019-01-03

## 2019-01-07 ENCOUNTER — TELEPHONE (OUTPATIENT)
Dept: FAMILY MEDICINE CLINIC | Facility: CLINIC | Age: 74
End: 2019-01-07

## 2019-01-08 PROBLEM — M62.81 MUSCLE WEAKNESS (GENERALIZED): Status: ACTIVE | Noted: 2019-01-08

## 2019-01-08 PROBLEM — R26.81 GAIT INSTABILITY: Status: ACTIVE | Noted: 2019-01-08

## 2019-01-18 ENCOUNTER — TELEPHONE (OUTPATIENT)
Dept: OTHER | Facility: OTHER | Age: 74
End: 2019-01-18

## 2019-01-18 DIAGNOSIS — E11.65 UNCONTROLLED TYPE 2 DIABETES MELLITUS WITH HYPERGLYCEMIA (HCC): Primary | ICD-10-CM

## 2019-01-18 DIAGNOSIS — E11.65 UNCONTROLLED TYPE 2 DIABETES MELLITUS WITH HYPERGLYCEMIA (HCC): ICD-10-CM

## 2019-01-18 RX ORDER — PEN NEEDLE, DIABETIC 30 GX3/16"
NEEDLE, DISPOSABLE MISCELLANEOUS 4 TIMES DAILY
Qty: 120 EACH | Refills: 5 | Status: SHIPPED | OUTPATIENT
Start: 2019-01-18 | End: 2019-01-18 | Stop reason: SDUPTHER

## 2019-01-18 RX ORDER — PEN NEEDLE, DIABETIC 30 GX3/16"
NEEDLE, DISPOSABLE MISCELLANEOUS 4 TIMES DAILY
Qty: 120 EACH | Refills: 0 | Status: SHIPPED | OUTPATIENT
Start: 2019-01-18

## 2019-01-19 NOTE — TELEPHONE ENCOUNTER
Evelyn High 1945  CONFIDENTIALTY NOTICE: This fax transmission is intended only for the addressee  It contains information that is legally privileged,  confidential or otherwise protected from use or disclosure  If you are not the intended recipient, you are strictly prohibited from reviewing,  disclosing, copying using or disseminating any of this information or taking any action in reliance on or regarding this information  If you have  received this fax in error, please notify us immediately by telephone so that we can arrange for its return to us  Page:   Call Id: [id number]  Health Call  Standard Call Report  Health Call  Patient Name: Evelyn High  Gender: Male  : 1945  Age: 75 Y 3 M 21 D  Return Phone  Number: [phone] (Home)  Address: [address] [place] [address]  City/State/Zip: [address] [place] [address]  Practice Name: Dain Lopez  Practice Charged:  Physician:  [facility] Name: Mike Chapmanor  Relationship To  Patient: Other  Return Phone Number: [phone] (Home)  Presenting Problem: "I have insulin but no needles  It is an  emergency "  Service Type: Triage  Charged Service 1: N/A  Pharmacy Name and  Number: [address] [facility]  Nurse Assessment  Protocols  Protocol Title Nurse Date/Time  Disp  Time Disposition Final User  2019 8:03:14 PM Send to Follow Up Blanca Hayes RN, Tiburcio Sahu  2019 8:23:37 PM Send to Follow Up Blanca Hayes RN, Tiburcio Sahu  2019 8:36:25 PM Close Yes Edwin Gallardo RN, Tiburcio Sahu  Comments  User:  Olena Locke RN Date/Time: 2019 8:35:28 PM  Spoke with Dr Brionna Rios @  and she has sent the prescription to [facility]

## 2019-01-27 DIAGNOSIS — E11.42 TYPE 2 DIABETES MELLITUS WITH DIABETIC POLYNEUROPATHY, WITH LONG-TERM CURRENT USE OF INSULIN (HCC): ICD-10-CM

## 2019-01-27 DIAGNOSIS — Z79.4 TYPE 2 DIABETES MELLITUS WITH DIABETIC POLYNEUROPATHY, WITH LONG-TERM CURRENT USE OF INSULIN (HCC): ICD-10-CM

## 2019-01-27 RX ORDER — INSULIN LISPRO 100 [IU]/ML
12 INJECTION, SOLUTION INTRAVENOUS; SUBCUTANEOUS
Qty: 4 PEN | Refills: 0 | Status: SHIPPED | OUTPATIENT
Start: 2019-01-27 | End: 2019-02-06 | Stop reason: SDUPTHER

## 2019-01-28 DIAGNOSIS — J43.8 OTHER EMPHYSEMA (HCC): ICD-10-CM

## 2019-01-28 DIAGNOSIS — F41.9 ANXIETY: ICD-10-CM

## 2019-01-28 RX ORDER — IPRATROPIUM BROMIDE AND ALBUTEROL SULFATE 2.5; .5 MG/3ML; MG/3ML
SOLUTION RESPIRATORY (INHALATION)
Qty: 180 ML | Refills: 0 | Status: SHIPPED | OUTPATIENT
Start: 2019-01-28 | End: 2019-02-25 | Stop reason: SDUPTHER

## 2019-01-28 RX ORDER — LORAZEPAM 0.5 MG/1
0.5 TABLET ORAL 4 TIMES DAILY PRN
Qty: 120 TABLET | Refills: 0 | Status: SHIPPED | OUTPATIENT
Start: 2019-01-28 | End: 2019-03-20 | Stop reason: SDUPTHER

## 2019-01-30 LAB — HBA1C MFR BLD HPLC: 6.3 %

## 2019-02-04 ENCOUNTER — TELEPHONE (OUTPATIENT)
Dept: FAMILY MEDICINE CLINIC | Facility: CLINIC | Age: 74
End: 2019-02-04

## 2019-02-04 DIAGNOSIS — K21.9 GASTROESOPHAGEAL REFLUX DISEASE WITHOUT ESOPHAGITIS: Primary | ICD-10-CM

## 2019-02-04 DIAGNOSIS — R11.0 NAUSEA: ICD-10-CM

## 2019-02-04 DIAGNOSIS — F51.04 CHRONIC INSOMNIA: ICD-10-CM

## 2019-02-04 RX ORDER — MIRTAZAPINE 15 MG/1
15 TABLET, FILM COATED ORAL
Qty: 90 TABLET | Refills: 0 | Status: SHIPPED | OUTPATIENT
Start: 2019-02-04

## 2019-02-04 RX ORDER — ESOMEPRAZOLE MAGNESIUM 40 MG/1
40 CAPSULE, DELAYED RELEASE ORAL DAILY
Qty: 90 CAPSULE | Refills: 1 | Status: SHIPPED | OUTPATIENT
Start: 2019-02-04

## 2019-02-04 RX ORDER — PEN NEEDLE, DIABETIC 31 GX5/16"
NEEDLE, DISPOSABLE MISCELLANEOUS 4 TIMES DAILY
Refills: 0 | COMMUNITY
Start: 2019-01-18

## 2019-02-04 RX ORDER — ONDANSETRON 4 MG/1
4 TABLET, ORALLY DISINTEGRATING ORAL EVERY 8 HOURS PRN
Qty: 270 TABLET | Refills: 0 | Status: SHIPPED | OUTPATIENT
Start: 2019-02-04 | End: 2019-02-13 | Stop reason: SDUPTHER

## 2019-02-04 NOTE — TELEPHONE ENCOUNTER
Please when you take medication,  If not on med list, get dosage and instructions  I sent nexium  Did he need zofran ?   Or Dapaglfozin - I had that discontinued on his med list

## 2019-02-04 NOTE — TELEPHONE ENCOUNTER
Prabhakar  zopfrin  nexium 40    I did not see on the list   But he is in need of these    Can he have 3 month supply  Daughter called for the refills  That is the information she gave me

## 2019-02-05 DIAGNOSIS — J43.8 OTHER EMPHYSEMA (HCC): ICD-10-CM

## 2019-02-06 DIAGNOSIS — Z79.4 TYPE 2 DIABETES MELLITUS WITH DIABETIC POLYNEUROPATHY, WITH LONG-TERM CURRENT USE OF INSULIN (HCC): ICD-10-CM

## 2019-02-06 DIAGNOSIS — F33.1 MODERATE EPISODE OF RECURRENT MAJOR DEPRESSIVE DISORDER (HCC): ICD-10-CM

## 2019-02-06 DIAGNOSIS — F51.04 CHRONIC INSOMNIA: ICD-10-CM

## 2019-02-06 DIAGNOSIS — E11.42 TYPE 2 DIABETES MELLITUS WITH DIABETIC POLYNEUROPATHY, WITH LONG-TERM CURRENT USE OF INSULIN (HCC): ICD-10-CM

## 2019-02-06 RX ORDER — MIRTAZAPINE 30 MG/1
30 TABLET, FILM COATED ORAL
Qty: 90 TABLET | Refills: 0 | Status: SHIPPED | OUTPATIENT
Start: 2019-02-06

## 2019-02-12 DIAGNOSIS — Z79.4 TYPE 2 DIABETES MELLITUS WITH DIABETIC POLYNEUROPATHY, WITH LONG-TERM CURRENT USE OF INSULIN (HCC): ICD-10-CM

## 2019-02-12 DIAGNOSIS — E11.42 TYPE 2 DIABETES MELLITUS WITH DIABETIC POLYNEUROPATHY, WITH LONG-TERM CURRENT USE OF INSULIN (HCC): ICD-10-CM

## 2019-02-12 RX ORDER — INSULIN HUMAN 100 [IU]/ML
INJECTION, SUSPENSION SUBCUTANEOUS
Qty: 15 PEN | Refills: 0 | Status: SHIPPED | OUTPATIENT
Start: 2019-02-12

## 2019-02-13 ENCOUNTER — TELEPHONE (OUTPATIENT)
Dept: FAMILY MEDICINE CLINIC | Facility: CLINIC | Age: 74
End: 2019-02-13

## 2019-02-13 DIAGNOSIS — J44.9 CHRONIC OBSTRUCTIVE PULMONARY DISEASE, UNSPECIFIED COPD TYPE (HCC): ICD-10-CM

## 2019-02-13 DIAGNOSIS — R11.0 NAUSEA: ICD-10-CM

## 2019-02-13 DIAGNOSIS — R39.9 SYMPTOMS INVOLVING URINARY SYSTEM: Primary | ICD-10-CM

## 2019-02-13 DIAGNOSIS — E78.49 OTHER HYPERLIPIDEMIA: ICD-10-CM

## 2019-02-13 DIAGNOSIS — E11.59 TYPE 2 DIABETES MELLITUS WITH OTHER CIRCULATORY COMPLICATION, WITHOUT LONG-TERM CURRENT USE OF INSULIN (HCC): ICD-10-CM

## 2019-02-13 RX ORDER — ONDANSETRON 4 MG/1
4 TABLET, ORALLY DISINTEGRATING ORAL EVERY 8 HOURS PRN
Qty: 270 TABLET | Refills: 0 | Status: SHIPPED | OUTPATIENT
Start: 2019-02-13 | End: 2019-02-21 | Stop reason: SDUPTHER

## 2019-02-13 RX ORDER — PREDNISONE 10 MG/1
TABLET ORAL
Qty: 90 TABLET | Refills: 0 | Status: SHIPPED | OUTPATIENT
Start: 2019-02-13 | End: 2019-05-31 | Stop reason: SDUPTHER

## 2019-02-13 RX ORDER — ROSUVASTATIN CALCIUM 10 MG/1
TABLET, COATED ORAL
Qty: 90 TABLET | Refills: 0 | Status: SHIPPED | OUTPATIENT
Start: 2019-02-13 | End: 2019-05-31 | Stop reason: SDUPTHER

## 2019-02-13 RX ORDER — PHENAZOPYRIDINE HYDROCHLORIDE 200 MG/1
200 TABLET, FILM COATED ORAL
Qty: 10 TABLET | Refills: 0 | Status: SHIPPED | OUTPATIENT
Start: 2019-02-13

## 2019-02-13 NOTE — TELEPHONE ENCOUNTER
Pt daughter is requesting Anti Vomit medication for cancer       RX name is odansetron 4 mg [facility]

## 2019-02-21 DIAGNOSIS — R11.0 NAUSEA: ICD-10-CM

## 2019-02-21 RX ORDER — ONDANSETRON 4 MG/1
4 TABLET, ORALLY DISINTEGRATING ORAL EVERY 8 HOURS PRN
Qty: 270 TABLET | Refills: 0 | Status: SHIPPED | OUTPATIENT
Start: 2019-02-21 | End: 2019-02-25 | Stop reason: SDUPTHER

## 2019-02-23 DIAGNOSIS — J44.9 CHRONIC OBSTRUCTIVE PULMONARY DISEASE, UNSPECIFIED COPD TYPE (HCC): ICD-10-CM

## 2019-02-23 DIAGNOSIS — J96.11 CHRONIC HYPOXEMIC RESPIRATORY FAILURE (HCC): ICD-10-CM

## 2019-02-23 DIAGNOSIS — J41.1 MUCOPURULENT CHRONIC BRONCHITIS (HCC): ICD-10-CM

## 2019-02-23 DIAGNOSIS — E78.49 OTHER HYPERLIPIDEMIA: ICD-10-CM

## 2019-02-24 RX ORDER — PREDNISONE 1 MG/1
TABLET ORAL
Qty: 90 TABLET | Refills: 0 | Status: SHIPPED | OUTPATIENT
Start: 2019-02-24 | End: 2019-05-31 | Stop reason: SDUPTHER

## 2019-02-24 RX ORDER — AZITHROMYCIN 250 MG/1
TABLET, FILM COATED ORAL
Qty: 90 TABLET | Refills: 0 | Status: SHIPPED | OUTPATIENT
Start: 2019-02-24 | End: 2019-05-31 | Stop reason: SDUPTHER

## 2019-02-24 RX ORDER — ROSUVASTATIN CALCIUM 10 MG/1
TABLET, COATED ORAL
Qty: 90 TABLET | Refills: 0 | Status: SHIPPED | OUTPATIENT
Start: 2019-02-24

## 2019-02-25 DIAGNOSIS — J43.8 OTHER EMPHYSEMA (HCC): ICD-10-CM

## 2019-02-25 DIAGNOSIS — E87.6 HYPOKALEMIA: Primary | ICD-10-CM

## 2019-02-25 DIAGNOSIS — R11.0 NAUSEA: ICD-10-CM

## 2019-02-25 RX ORDER — ONDANSETRON 4 MG/1
4 TABLET, ORALLY DISINTEGRATING ORAL EVERY 8 HOURS PRN
Qty: 270 TABLET | Refills: 0 | Status: SHIPPED | OUTPATIENT
Start: 2019-02-25

## 2019-02-25 RX ORDER — IPRATROPIUM BROMIDE AND ALBUTEROL SULFATE 2.5; .5 MG/3ML; MG/3ML
SOLUTION RESPIRATORY (INHALATION)
Qty: 360 VIAL | Refills: 0 | Status: SHIPPED | OUTPATIENT
Start: 2019-02-25 | End: 2019-03-25 | Stop reason: SDUPTHER

## 2019-02-25 RX ORDER — POTASSIUM CHLORIDE 750 MG/1
10 CAPSULE, EXTENDED RELEASE ORAL 2 TIMES DAILY
Qty: 180 CAPSULE | Refills: 0 | Status: SHIPPED | OUTPATIENT
Start: 2019-02-25 | End: 2019-05-23 | Stop reason: SDUPTHER

## 2019-03-04 ENCOUNTER — TELEPHONE (OUTPATIENT)
Dept: FAMILY MEDICINE CLINIC | Facility: CLINIC | Age: 74
End: 2019-03-04

## 2019-03-15 ENCOUNTER — TELEPHONE (OUTPATIENT)
Dept: FAMILY MEDICINE CLINIC | Facility: CLINIC | Age: 74
End: 2019-03-15

## 2019-03-20 ENCOUNTER — DOCUMENTATION (OUTPATIENT)
Dept: FAMILY MEDICINE CLINIC | Facility: CLINIC | Age: 74
End: 2019-03-20

## 2019-03-20 DIAGNOSIS — F41.9 ANXIETY: ICD-10-CM

## 2019-03-20 DIAGNOSIS — R11.0 NAUSEA: ICD-10-CM

## 2019-03-20 RX ORDER — LORAZEPAM 0.5 MG/1
0.5 TABLET ORAL 4 TIMES DAILY PRN
Qty: 120 TABLET | Refills: 0 | Status: SHIPPED | OUTPATIENT
Start: 2019-03-20 | End: 2019-05-24 | Stop reason: SDUPTHER

## 2019-03-21 ENCOUNTER — TELEPHONE (OUTPATIENT)
Dept: FAMILY MEDICINE CLINIC | Facility: CLINIC | Age: 74
End: 2019-03-21

## 2019-03-25 DIAGNOSIS — J43.8 OTHER EMPHYSEMA (HCC): ICD-10-CM

## 2019-03-25 RX ORDER — IPRATROPIUM BROMIDE AND ALBUTEROL SULFATE 2.5; .5 MG/3ML; MG/3ML
SOLUTION RESPIRATORY (INHALATION)
Qty: 360 VIAL | Refills: 0 | Status: SHIPPED | OUTPATIENT
Start: 2019-03-25 | End: 2019-06-06

## 2019-04-12 DIAGNOSIS — J43.8 OTHER EMPHYSEMA (HCC): ICD-10-CM

## 2019-04-13 RX ORDER — IPRATROPIUM BROMIDE AND ALBUTEROL SULFATE 2.5; .5 MG/3ML; MG/3ML
SOLUTION RESPIRATORY (INHALATION)
Qty: 360 ML | Refills: 0 | Status: SHIPPED | OUTPATIENT
Start: 2019-04-13 | End: 2019-05-17 | Stop reason: SDUPTHER

## 2019-05-13 ENCOUNTER — TELEPHONE (OUTPATIENT)
Dept: FAMILY MEDICINE CLINIC | Facility: CLINIC | Age: 74
End: 2019-05-13

## 2019-05-17 DIAGNOSIS — J43.8 OTHER EMPHYSEMA (HCC): ICD-10-CM

## 2019-05-17 RX ORDER — IPRATROPIUM BROMIDE AND ALBUTEROL SULFATE 2.5; .5 MG/3ML; MG/3ML
SOLUTION RESPIRATORY (INHALATION)
Qty: 360 ML | Refills: 0 | Status: SHIPPED | OUTPATIENT
Start: 2019-05-17 | End: 2019-06-06

## 2019-05-23 DIAGNOSIS — E87.6 HYPOKALEMIA: ICD-10-CM

## 2019-05-23 RX ORDER — POTASSIUM CHLORIDE 750 MG/1
CAPSULE, EXTENDED RELEASE ORAL
Qty: 180 CAPSULE | Refills: 0 | Status: SHIPPED | OUTPATIENT
Start: 2019-05-23

## 2019-05-24 DIAGNOSIS — J44.9 END STAGE COPD (HCC): Primary | ICD-10-CM

## 2019-05-24 DIAGNOSIS — F41.9 ANXIETY: ICD-10-CM

## 2019-05-24 RX ORDER — TIOTROPIUM BROMIDE INHALATION SPRAY 3.12 UG/1
2 SPRAY, METERED RESPIRATORY (INHALATION) DAILY
Qty: 4 G | Refills: 2 | Status: SHIPPED | OUTPATIENT
Start: 2019-05-24

## 2019-05-24 RX ORDER — LORAZEPAM 0.5 MG/1
0.5 TABLET ORAL 4 TIMES DAILY PRN
Qty: 120 TABLET | Refills: 0 | Status: SHIPPED | OUTPATIENT
Start: 2019-05-24 | End: 2019-08-06 | Stop reason: SDUPTHER

## 2019-05-31 DIAGNOSIS — J41.1 MUCOPURULENT CHRONIC BRONCHITIS (HCC): ICD-10-CM

## 2019-05-31 DIAGNOSIS — E78.49 OTHER HYPERLIPIDEMIA: ICD-10-CM

## 2019-05-31 DIAGNOSIS — J96.11 CHRONIC HYPOXEMIC RESPIRATORY FAILURE (HCC): ICD-10-CM

## 2019-05-31 DIAGNOSIS — J44.9 CHRONIC OBSTRUCTIVE PULMONARY DISEASE, UNSPECIFIED COPD TYPE (HCC): ICD-10-CM

## 2019-05-31 DIAGNOSIS — E11.59 TYPE 2 DIABETES MELLITUS WITH OTHER CIRCULATORY COMPLICATION, WITHOUT LONG-TERM CURRENT USE OF INSULIN (HCC): ICD-10-CM

## 2019-06-01 RX ORDER — AZITHROMYCIN 250 MG/1
TABLET, FILM COATED ORAL
Qty: 90 TABLET | Refills: 0 | Status: SHIPPED | OUTPATIENT
Start: 2019-06-01 | End: 2019-07-16 | Stop reason: SDUPTHER

## 2019-06-01 RX ORDER — PREDNISONE 10 MG/1
TABLET ORAL
Qty: 90 TABLET | Refills: 0 | Status: SHIPPED | OUTPATIENT
Start: 2019-06-01 | End: 2019-07-16 | Stop reason: SDUPTHER

## 2019-06-01 RX ORDER — PREDNISONE 1 MG/1
TABLET ORAL
Qty: 90 TABLET | Refills: 0 | Status: SHIPPED | OUTPATIENT
Start: 2019-06-01 | End: 2019-07-16 | Stop reason: SDUPTHER

## 2019-06-01 RX ORDER — ROSUVASTATIN CALCIUM 10 MG/1
TABLET, COATED ORAL
Qty: 90 TABLET | Refills: 0 | Status: SHIPPED | OUTPATIENT
Start: 2019-06-01

## 2019-06-06 ENCOUNTER — TELEPHONE (OUTPATIENT)
Dept: FAMILY MEDICINE CLINIC | Facility: CLINIC | Age: 74
End: 2019-06-06

## 2019-06-06 DIAGNOSIS — J43.8 OTHER EMPHYSEMA (HCC): ICD-10-CM

## 2019-06-06 RX ORDER — IPRATROPIUM BROMIDE AND ALBUTEROL SULFATE 2.5; .5 MG/3ML; MG/3ML
SOLUTION RESPIRATORY (INHALATION)
Qty: 360 VIAL | Refills: 0 | Status: SHIPPED | OUTPATIENT
Start: 2019-06-06

## 2019-06-07 DIAGNOSIS — J44.9 CHRONIC OBSTRUCTIVE PULMONARY DISEASE, UNSPECIFIED COPD TYPE (HCC): ICD-10-CM

## 2019-07-16 DIAGNOSIS — J41.1 MUCOPURULENT CHRONIC BRONCHITIS (HCC): ICD-10-CM

## 2019-07-16 DIAGNOSIS — J96.11 CHRONIC HYPOXEMIC RESPIRATORY FAILURE (HCC): ICD-10-CM

## 2019-07-16 DIAGNOSIS — J44.9 CHRONIC OBSTRUCTIVE PULMONARY DISEASE, UNSPECIFIED COPD TYPE (HCC): ICD-10-CM

## 2019-07-16 DIAGNOSIS — E11.59 TYPE 2 DIABETES MELLITUS WITH OTHER CIRCULATORY COMPLICATION, WITHOUT LONG-TERM CURRENT USE OF INSULIN (HCC): ICD-10-CM

## 2019-07-17 RX ORDER — ALBUTEROL SULFATE 90 UG/1
AEROSOL, METERED RESPIRATORY (INHALATION)
Qty: 51 G | Refills: 2 | Status: SHIPPED | OUTPATIENT
Start: 2019-07-17

## 2019-07-17 RX ORDER — PREDNISONE 1 MG/1
TABLET ORAL
Qty: 90 TABLET | Refills: 0 | Status: SHIPPED | OUTPATIENT
Start: 2019-07-17

## 2019-07-17 RX ORDER — PREDNISONE 10 MG/1
TABLET ORAL
Qty: 90 TABLET | Refills: 0 | Status: SHIPPED | OUTPATIENT
Start: 2019-07-17

## 2019-07-17 RX ORDER — IPRATROPIUM BROMIDE AND ALBUTEROL SULFATE 2.5; .5 MG/3ML; MG/3ML
SOLUTION RESPIRATORY (INHALATION)
Qty: 1080 ML | Refills: 0 | Status: SHIPPED | OUTPATIENT
Start: 2019-07-17

## 2019-07-17 RX ORDER — AZITHROMYCIN 250 MG/1
TABLET, FILM COATED ORAL
Qty: 90 TABLET | Refills: 0 | Status: SHIPPED | OUTPATIENT
Start: 2019-07-17 | End: 2019-10-15

## 2019-08-06 DIAGNOSIS — F41.9 ANXIETY: ICD-10-CM

## 2019-08-06 RX ORDER — LORAZEPAM 0.5 MG/1
0.5 TABLET ORAL 4 TIMES DAILY PRN
Qty: 120 TABLET | Refills: 0 | Status: SHIPPED | OUTPATIENT
Start: 2019-08-06

## 2021-08-18 NOTE — TELEPHONE ENCOUNTER
Faxed yesterday   I think the note meant 3 times a week, not 3 times a day
PATIENT NEEDS ORDERS FAXED TO [facility] #[phone] FOR ORDERS FOR AT HOME PHYSICAL THERAPY HARLEEN HOLLOWAY WILL BE BRINGING HIM HOME THIS WEEK 
no

## 2023-07-31 NOTE — PATIENT INSTRUCTIONS
CIPRO 1 TAB TWICE A DAY , urine culture  DIVIDE DILTIAZEM 120MG TWICE A DAY , keep follow up with cardiology  IRON LEVEL for anemai  Keep follow up with urology for hematuria- cystoscopy Billing Type: Third-Party Bill